# Patient Record
Sex: FEMALE | Race: WHITE | Employment: OTHER | URBAN - METROPOLITAN AREA
[De-identification: names, ages, dates, MRNs, and addresses within clinical notes are randomized per-mention and may not be internally consistent; named-entity substitution may affect disease eponyms.]

---

## 2017-12-13 ENCOUNTER — HOSPITAL ENCOUNTER (OUTPATIENT)
Dept: BONE DENSITY | Age: 77
Discharge: HOME | End: 2017-12-13
Attending: GENERAL ACUTE CARE HOSPITAL
Payer: MEDICARE

## 2017-12-13 ENCOUNTER — HOSPITAL ENCOUNTER (OUTPATIENT)
Dept: MAMMOGRAPHY | Age: 77
Discharge: HOME | End: 2017-12-13
Attending: GENERAL ACUTE CARE HOSPITAL
Payer: MEDICARE

## 2017-12-13 DIAGNOSIS — Z12.31 VISIT FOR SCREENING MAMMOGRAM: ICD-10-CM

## 2017-12-13 DIAGNOSIS — M81.0 OSTEOPOROSIS: ICD-10-CM

## 2017-12-13 PROCEDURE — 77067 SCR MAMMO BI INCL CAD: CPT

## 2017-12-13 PROCEDURE — 77080 DXA BONE DENSITY AXIAL: CPT

## 2018-05-14 ENCOUNTER — HOSPITAL ENCOUNTER (OUTPATIENT)
Dept: CT IMAGING | Age: 78
Discharge: HOME | End: 2018-05-14
Attending: GENERAL ACUTE CARE HOSPITAL
Payer: MEDICARE

## 2018-05-14 DIAGNOSIS — R63.4 ABNORMAL WEIGHT LOSS: ICD-10-CM

## 2018-05-14 PROCEDURE — 71250 CT THORAX DX C-: CPT

## 2018-06-04 ENCOUNTER — HOSPITAL ENCOUNTER (OUTPATIENT)
Dept: ULTRASOUND IMAGING | Age: 78
Discharge: HOME | End: 2018-06-04
Attending: GENERAL ACUTE CARE HOSPITAL
Payer: MEDICARE

## 2018-06-04 DIAGNOSIS — R82.79 POSITIVE URINE CULTURE: ICD-10-CM

## 2018-06-04 PROCEDURE — 76770 US EXAM ABDO BACK WALL COMP: CPT

## 2018-06-05 ENCOUNTER — HOSPITAL ENCOUNTER (EMERGENCY)
Age: 78
Discharge: HOME | End: 2018-06-05
Attending: EMERGENCY MEDICINE
Payer: MEDICARE

## 2018-06-05 ENCOUNTER — APPOINTMENT (OUTPATIENT)
Dept: GENERAL RADIOLOGY | Age: 78
End: 2018-06-05
Attending: PHYSICIAN ASSISTANT
Payer: MEDICARE

## 2018-06-05 VITALS
DIASTOLIC BLOOD PRESSURE: 97 MMHG | BODY MASS INDEX: 22.62 KG/M2 | HEIGHT: 60 IN | OXYGEN SATURATION: 99 % | HEART RATE: 73 BPM | WEIGHT: 115.2 LBS | SYSTOLIC BLOOD PRESSURE: 162 MMHG | RESPIRATION RATE: 16 BRPM | TEMPERATURE: 98 F

## 2018-06-05 DIAGNOSIS — E86.0 DEHYDRATION: ICD-10-CM

## 2018-06-05 DIAGNOSIS — K59.00 CONSTIPATION, UNSPECIFIED CONSTIPATION TYPE: Primary | ICD-10-CM

## 2018-06-05 LAB
ALBUMIN SERPL-MCNC: 4.3 G/DL (ref 3.5–5)
ALBUMIN/GLOB SERPL: 1.2 {RATIO} (ref 1.1–2.2)
ALP SERPL-CCNC: 73 U/L (ref 45–117)
ALT SERPL-CCNC: 104 U/L (ref 12–78)
ANION GAP SERPL CALC-SCNC: 7 MMOL/L (ref 5–15)
APPEARANCE UR: CLEAR
AST SERPL-CCNC: 87 U/L (ref 15–37)
BACTERIA URNS QL MICRO: NEGATIVE /HPF
BASOPHILS # BLD: 0 K/UL (ref 0–0.1)
BASOPHILS NFR BLD: 0 % (ref 0–1)
BILIRUB SERPL-MCNC: 0.9 MG/DL (ref 0.2–1)
BILIRUB UR QL: NEGATIVE
BUN SERPL-MCNC: 24 MG/DL (ref 6–20)
BUN/CREAT SERPL: 24 (ref 12–20)
CALCIUM SERPL-MCNC: 9.7 MG/DL (ref 8.5–10.1)
CHLORIDE SERPL-SCNC: 102 MMOL/L (ref 97–108)
CO2 SERPL-SCNC: 29 MMOL/L (ref 21–32)
COLOR UR: NORMAL
CREAT SERPL-MCNC: 1.02 MG/DL (ref 0.55–1.02)
DIFFERENTIAL METHOD BLD: ABNORMAL
EOSINOPHIL # BLD: 0.1 K/UL (ref 0–0.4)
EOSINOPHIL NFR BLD: 1 % (ref 0–7)
EPITH CASTS URNS QL MICRO: NORMAL /LPF
ERYTHROCYTE [DISTWIDTH] IN BLOOD BY AUTOMATED COUNT: 12.4 % (ref 11.5–14.5)
GLOBULIN SER CALC-MCNC: 3.6 G/DL (ref 2–4)
GLUCOSE SERPL-MCNC: 93 MG/DL (ref 65–100)
GLUCOSE UR STRIP.AUTO-MCNC: NEGATIVE MG/DL
HCT VFR BLD AUTO: 46.4 % (ref 35–47)
HGB BLD-MCNC: 15.3 G/DL (ref 11.5–16)
HGB UR QL STRIP: NEGATIVE
HYALINE CASTS URNS QL MICRO: NORMAL /LPF (ref 0–5)
IMM GRANULOCYTES # BLD: 0 K/UL (ref 0–0.04)
IMM GRANULOCYTES NFR BLD AUTO: 0 % (ref 0–0.5)
KETONES UR QL STRIP.AUTO: NEGATIVE MG/DL
LEUKOCYTE ESTERASE UR QL STRIP.AUTO: NEGATIVE
LIPASE SERPL-CCNC: 206 U/L (ref 73–393)
LYMPHOCYTES # BLD: 1.9 K/UL (ref 0.8–3.5)
LYMPHOCYTES NFR BLD: 16 % (ref 12–49)
MCH RBC QN AUTO: 30.1 PG (ref 26–34)
MCHC RBC AUTO-ENTMCNC: 33 G/DL (ref 30–36.5)
MCV RBC AUTO: 91.2 FL (ref 80–99)
MONOCYTES # BLD: 1 K/UL (ref 0–1)
MONOCYTES NFR BLD: 8 % (ref 5–13)
NEUTS SEG # BLD: 8.8 K/UL (ref 1.8–8)
NEUTS SEG NFR BLD: 74 % (ref 32–75)
NITRITE UR QL STRIP.AUTO: NEGATIVE
NRBC # BLD: 0 K/UL (ref 0–0.01)
NRBC BLD-RTO: 0 PER 100 WBC
PH UR STRIP: 5.5 [PH] (ref 5–8)
PLATELET # BLD AUTO: 231 K/UL (ref 150–400)
PMV BLD AUTO: 11.4 FL (ref 8.9–12.9)
POTASSIUM SERPL-SCNC: 4.3 MMOL/L (ref 3.5–5.1)
PROT SERPL-MCNC: 7.9 G/DL (ref 6.4–8.2)
PROT UR STRIP-MCNC: NEGATIVE MG/DL
RBC # BLD AUTO: 5.09 M/UL (ref 3.8–5.2)
RBC #/AREA URNS HPF: NORMAL /HPF (ref 0–5)
SODIUM SERPL-SCNC: 138 MMOL/L (ref 136–145)
SP GR UR REFRACTOMETRY: 1.01 (ref 1–1.03)
UR CULT HOLD, URHOLD: NORMAL
UROBILINOGEN UR QL STRIP.AUTO: 0.2 EU/DL (ref 0.2–1)
WBC # BLD AUTO: 11.9 K/UL (ref 3.6–11)
WBC URNS QL MICRO: NORMAL /HPF (ref 0–4)

## 2018-06-05 PROCEDURE — 81001 URINALYSIS AUTO W/SCOPE: CPT | Performed by: PHYSICIAN ASSISTANT

## 2018-06-05 PROCEDURE — 51798 US URINE CAPACITY MEASURE: CPT

## 2018-06-05 PROCEDURE — 85025 COMPLETE CBC W/AUTO DIFF WBC: CPT | Performed by: PHYSICIAN ASSISTANT

## 2018-06-05 PROCEDURE — 99284 EMERGENCY DEPT VISIT MOD MDM: CPT

## 2018-06-05 PROCEDURE — 77030011943

## 2018-06-05 PROCEDURE — 74019 RADEX ABDOMEN 2 VIEWS: CPT

## 2018-06-05 PROCEDURE — 96360 HYDRATION IV INFUSION INIT: CPT

## 2018-06-05 PROCEDURE — 74011250637 HC RX REV CODE- 250/637: Performed by: EMERGENCY MEDICINE

## 2018-06-05 PROCEDURE — 80053 COMPREHEN METABOLIC PANEL: CPT | Performed by: PHYSICIAN ASSISTANT

## 2018-06-05 PROCEDURE — 83690 ASSAY OF LIPASE: CPT | Performed by: PHYSICIAN ASSISTANT

## 2018-06-05 PROCEDURE — 51701 INSERT BLADDER CATHETER: CPT

## 2018-06-05 PROCEDURE — 74011250636 HC RX REV CODE- 250/636: Performed by: EMERGENCY MEDICINE

## 2018-06-05 PROCEDURE — 36415 COLL VENOUS BLD VENIPUNCTURE: CPT | Performed by: PHYSICIAN ASSISTANT

## 2018-06-05 RX ORDER — MAGNESIUM CITRATE
296 SOLUTION, ORAL ORAL
Status: COMPLETED | OUTPATIENT
Start: 2018-06-05 | End: 2018-06-05

## 2018-06-05 RX ORDER — POLYETHYLENE GLYCOL 3350 17 G/17G
17 POWDER, FOR SOLUTION ORAL DAILY
Qty: 140 G | Refills: 0 | Status: SHIPPED | OUTPATIENT
Start: 2018-06-05 | End: 2018-06-12

## 2018-06-05 RX ADMIN — MAGESIUM CITRATE 296 ML: 1.75 LIQUID ORAL at 15:09

## 2018-06-05 RX ADMIN — SODIUM CHLORIDE 500 ML: 900 INJECTION, SOLUTION INTRAVENOUS at 17:18

## 2018-06-05 NOTE — DISCHARGE INSTRUCTIONS
We hope that we have addressed all of your medical concerns. The examination and treatment you received in the Emergency Department were for an emergent problem and were not intended as complete care. It is important that you follow up with your healthcare provider(s) for ongoing care. If your symptoms worsen or do not improve as expected, and you are unable to reach your usual health care provider(s), you should return to the Emergency Department. Today's healthcare is undergoing tremendous change, and patient satisfaction surveys are one of the many tools to assess the quality of medical care. You may receive a survey from the [employer] regarding your experience in the Emergency Department. I hope that your experience has been completely positive, particularly the medical care that I provided. As such, please participate in the survey; anything less than excellent does not meet my expectations or intentions. [address] [place] [address] participate in nationally recognized quality of care measures. If your blood pressure is greater than 120/80, as reported below, we urge that you seek medical care to address the potential of high blood pressure, commonly known as hypertension. Hypertension can be hereditary or can be caused by certain medical conditions, pain, stress, or \"white coat syndrome. \"       Please make an appointment with your health care provider(s) for follow up of your Emergency Department visit. VITALS:   Patient Vitals for the past 8 hrs:   Temp Pulse Resp BP SpO2   06/05/18 1316 97.6 °F (36.4 °C) 80 18 131/72 95 %          Thank you for allowing us to provide you with medical care today. We realize that you have many choices for your emergency care needs. Please choose us in the future for any continued health care needs.       Karen Simon MD    [facility]   Office: [phone]            Recent Results (from the past 24 hour(s))   CBC WITH AUTOMATED DIFF    Collection Time: 06/05/18  1:33 PM   Result Value Ref Range    WBC 11.9 (H) 3.6 - 11.0 K/uL    RBC 5.09 3.80 - 5.20 M/uL    HGB 15.3 11.5 - 16.0 g/dL    HCT 46.4 35.0 - 47.0 %    MCV 91.2 80.0 - 99.0 FL    MCH 30.1 26.0 - 34.0 PG    MCHC 33.0 30.0 - 36.5 g/dL    RDW 12.4 11.5 - 14.5 %    PLATELET 123 345 - 451 K/uL    MPV 11.4 8.9 - 12.9 FL    NRBC 0.0 0  WBC    ABSOLUTE NRBC 0.00 0.00 - 0.01 K/uL    NEUTROPHILS 74 32 - 75 %    LYMPHOCYTES 16 12 - 49 %    MONOCYTES 8 5 - 13 %    EOSINOPHILS 1 0 - 7 %    BASOPHILS 0 0 - 1 %    IMMATURE GRANULOCYTES 0 0.0 - 0.5 %    ABS. NEUTROPHILS 8.8 (H) 1.8 - 8.0 K/UL    ABS. LYMPHOCYTES 1.9 0.8 - 3.5 K/UL    ABS. MONOCYTES 1.0 0.0 - 1.0 K/UL    ABS. EOSINOPHILS 0.1 0.0 - 0.4 K/UL    ABS. BASOPHILS 0.0 0.0 - 0.1 K/UL    ABS. IMM. GRANS. 0.0 0.00 - 0.04 K/UL    DF AUTOMATED     METABOLIC PANEL, COMPREHENSIVE    Collection Time: 06/05/18  1:33 PM   Result Value Ref Range    Sodium 138 136 - 145 mmol/L    Potassium 4.3 3.5 - 5.1 mmol/L    Chloride 102 97 - 108 mmol/L    CO2 29 21 - 32 mmol/L    Anion gap 7 5 - 15 mmol/L    Glucose 93 65 - 100 mg/dL    BUN 24 (H) 6 - 20 MG/DL    Creatinine 1.02 0.55 - 1.02 MG/DL    BUN/Creatinine ratio 24 (H) 12 - 20      GFR est AA >60 >60 ml/min/1.73m2    GFR est non-AA 53 (L) >60 ml/min/1.73m2    Calcium 9.7 8.5 - 10.1 MG/DL    Bilirubin, total 0.9 0.2 - 1.0 MG/DL    ALT (SGPT) 104 (H) 12 - 78 U/L    AST (SGOT) 87 (H) 15 - 37 U/L    Alk.  phosphatase 73 45 - 117 U/L    Protein, total 7.9 6.4 - 8.2 g/dL    Albumin 4.3 3.5 - 5.0 g/dL    Globulin 3.6 2.0 - 4.0 g/dL    A-G Ratio 1.2 1.1 - 2.2     LIPASE    Collection Time: 06/05/18  1:33 PM   Result Value Ref Range    Lipase 206 73 - 393 U/L   URINALYSIS W/MICROSCOPIC    Collection Time: 06/05/18  6:27 PM   Result Value Ref Range    Color YELLOW/STRAW      Appearance CLEAR CLEAR      Specific gravity 1.010 1.003 - 1.030      pH (UA) 5.5 5.0 - 8.0      Protein NEGATIVE  NEG mg/dL    Glucose NEGATIVE  NEG mg/dL    Ketone NEGATIVE  NEG mg/dL    Bilirubin NEGATIVE  NEG      Blood NEGATIVE  NEG      Urobilinogen 0.2 0.2 - 1.0 EU/dL    Nitrites NEGATIVE  NEG      Leukocyte Esterase NEGATIVE  NEG      WBC 0-4 0 - 4 /hpf    RBC 0-5 0 - 5 /hpf    Epithelial cells FEW FEW /lpf    Bacteria NEGATIVE  NEG /hpf    Hyaline cast 0-2 0 - 5 /lpf   URINE CULTURE HOLD SAMPLE    Collection Time: 06/05/18  6:27 PM   Result Value Ref Range    Urine culture hold        URINE ON HOLD IN MICROBIOLOGY DEPT FOR 3 DAYS. IF UNPRESERVED URINE IS SUBMITTED, IT CANNOT BE USED FOR ADDITIONAL TESTING AFTER 24 HRS, RECOLLECTION WILL BE REQUIRED. Xr Abd Flat/ Erect    Result Date: 6/5/2018  EXAM:  XR ABD FLAT/ ERECT INDICATION:   constipation; abdominal pain COMPARISON: None. FINDINGS: Supine and upright views of the abdomen demonstrate a normal gas pattern. There is no free intraperitoneal air. There is mild fecal stasis throughout the colon. . There are degenerative changes lower lumbar spine. .     IMPRESSION: No obstruction or free air. There is mild fecal stasis throughout the colon. Dehydration: Care Instructions  Your Care Instructions  Dehydration happens when your body loses too much fluid. This might happen when you do not drink enough water or you lose large amounts of fluids from your body because of diarrhea, vomiting, or sweating. Severe dehydration can be life-threatening. Water and minerals called electrolytes help put your body fluids back in balance. Learn the early signs of fluid loss, and drink more fluids to prevent dehydration. Follow-up care is a key part of your treatment and safety. Be sure to make and go to all appointments, and call your doctor if you are having problems. It's also a good idea to know your test results and keep a list of the medicines you take. How can you care for yourself at home?   · To prevent dehydration, drink plenty of fluids, enough so that your urine is light yellow or clear like water. Choose water and other caffeine-free clear liquids until you feel better. If you have kidney, heart, or liver disease and have to limit fluids, talk with your doctor before you increase the amount of fluids you drink. · If you do not feel like eating or drinking, try taking small sips of water, sports drinks, or other rehydration drinks. · Get plenty of rest.  To prevent dehydration  · Add more fluids to your diet and daily routine, unless your doctor has told you not to. · During hot weather, drink more fluids. Drink even more fluids if you exercise a lot. Stay away from drinks with alcohol or caffeine. · Watch for the symptoms of dehydration. These include:  ¨ A dry, sticky mouth. ¨ Dark yellow urine, and not much of it. ¨ Dry and sunken eyes. ¨ Feeling very tired. · Learn what problems can lead to dehydration. These include:  ¨ Diarrhea, fever, and vomiting. ¨ Any illness with a fever, such as pneumonia or the flu. ¨ Activities that cause heavy sweating, such as endurance races and heavy outdoor work in hot or humid weather. ¨ Alcohol or drug abuse or withdrawal.  ¨ Certain medicines, such as cold and allergy pills (antihistamines), diet pills (diuretics), and laxatives. ¨ Certain diseases, such as diabetes, cancer, and heart or kidney disease. When should you call for help? Call 911 anytime you think you may need emergency care. For example, call if:  ? · You passed out (lost consciousness). ?Call your doctor now or seek immediate medical care if:  ? · You are confused and cannot think clearly. ? · You are dizzy or lightheaded, or you feel like you may faint. ? · You have signs of needing more fluids. You have sunken eyes and a dry mouth, and you pass only a little dark urine. ? · You cannot keep fluids down. ? Watch closely for changes in your health, and be sure to contact your doctor if:  ? · You are not making tears. ? · Your skin is very dry and sags slowly back into place after you pinch it. ? · Your mouth and eyes are very dry. Where can you learn more? Go to http://raiza-radha.info/. Enter A919 in the search box to learn more about \"Dehydration: Care Instructions. \"  Current as of: March 20, 2017  Content Version: 11.4  © 4876-0463 [employer]. Care instructions adapted under license by [employer] (which disclaims liability or warranty for this information). If you have questions about a medical condition or this instruction, always ask your healthcare professional. [place] [address] any warranty or liability for your use of this information. Constipation: Care Instructions  Your Care Instructions    Constipation means that you have a hard time passing stools (bowel movements). People pass stools from 3 times a day to once every 3 days. What is normal for you may be different. Constipation may occur with pain in the rectum and cramping. The pain may get worse when you try to pass stools. Sometimes there are small amounts of bright red blood on toilet paper or the surface of stools. This is because of enlarged veins near the rectum (hemorrhoids). A few changes in your diet and lifestyle may help you avoid ongoing constipation. Your doctor may also prescribe medicine to help loosen your stool. Some medicines can cause constipation. These include pain medicines and antidepressants. Tell your doctor about all the medicines you take. Your doctor may want to make a medicine change to ease your symptoms. Follow-up care is a key part of your treatment and safety. Be sure to make and go to all appointments, and call your doctor if you are having problems. It's also a good idea to know your test results and keep a list of the medicines you take. How can you care for yourself at home?   · Drink plenty of fluids, enough so that your urine is light yellow or clear like water. If you have kidney, heart, or liver disease and have to limit fluids, talk with your doctor before you increase the amount of fluids you drink. · Include high-fiber foods in your diet each day. These include fruits, vegetables, beans, and whole grains. · Get at least 30 minutes of exercise on most days of the week. Walking is a good choice. You also may want to do other activities, such as running, swimming, cycling, or playing tennis or team sports. · Take a fiber supplement, such as Citrucel or Metamucil, every day. Read and follow all instructions on the label. · Schedule time each day for a bowel movement. A daily routine may help. Take your time having your bowel movement. · Support your feet with a small step stool when you sit on the toilet. This helps flex your hips and places your pelvis in a squatting position. · Your doctor may recommend an over-the-counter laxative to relieve your constipation. Examples are Milk of Magnesia and MiraLax. Read and follow all instructions on the label. Do not use laxatives on a long-term basis. When should you call for help? Call your doctor now or seek immediate medical care if:  ? · You have new or worse belly pain. ? · You have new or worse nausea or vomiting. ? · You have blood in your stools. ? Watch closely for changes in your health, and be sure to contact your doctor if:  ? · Your constipation is getting worse. ? · You do not get better as expected. Where can you learn more? Go to http://raiaz-radha.info/. Enter 21 [phone] in the search box to learn more about \"Constipation: Care Instructions. \"  Current as of: March 20, 2017  Content Version: 11.4  © 0576-8360 [employer]. Care instructions adapted under license by [employer] (which disclaims liability or warranty for this information).  If you have questions about a medical condition or this instruction, always ask your healthcare professional. [address] any warranty or liability for your use of this information. Urine Culture: About This Test  What is it? A urine culture is a test to find germs (such as bacteria) that can cause an infection. A sample of urine is added to a substance that promotes the growth of germs. If no germs grow, the culture is negative. If germs that can cause infection grow, the culture is positive. The type of germ may be identified using a microscope or chemical tests. Why is this test done? A urine culture may be done to:  · Find the cause of a urinary tract infection (UTI). · Make decisions about the best treatment for a UTI. · Find out whether treatment for a UTI worked. How can you prepare for the test?  You don't need to do anything before you have the test. If you are taking or have recently taken antibiotics, tell your doctor. What happens before the test?  You will need to drink enough fluids and avoid urinating so that you will be able to collect a urine sample. What happens during the test?  You will be asked to collect a clean-catch midstream urine sample for testing. The first urine of the day is best because bacteria levels will be higher. · Wash your hands before collecting the urine. · If the container has a lid, remove the lid of the container and set it down with the inner surface up. · Clean the area around your penis or vagina. · Begin urinating into the toilet or urinal.  · After the urine has flowed for several seconds, place the collection container in the stream and collect about 2 ounces (a quarter cup) of this \"midstream\" urine without stopping the flow. · Don't touch the rim of the container to your genital area. · Finish urinating into the toilet or urinal.  · Carefully replace the lid on the container. · Wash your hands. How long does the test take? · The test will take a few minutes.   What happens after the test?  · You will probably be able to go home right away. The results of a urine culture are usually available in 1 to 3 days. · You can go back to your usual activities right away. Follow-up care is a key part of your treatment and safety. Be sure to make and go to all appointments, and call your doctor if you are having problems. It's also a good idea to keep a list of the medicines you take. Ask your doctor when you can expect to have your test results. Where can you learn more? Go to http://raiza-radha.info/. Enter L533 in the search box to learn more about \"Urine Culture: About This Test.\"  Current as of: October 14, 2016  Content Version: 11.4  © 8884-2302 [employer]. Care instructions adapted under license by [employer] (which disclaims liability or warranty for this information). If you have questions about a medical condition or this instruction, always ask your healthcare professional. [place] [address] any warranty or liability for your use of this information.

## 2018-06-05 NOTE — ED NOTES
Pt attempted urine sample without success. Will retry. Pt drinking mag citrate.     4:02 PM  Unable to urinate or have a BM after drinking mag citrate. MD made aware. 4:27 PM  Pt given fleets enema, unable to hold solution in rectum for success. Soap suds enema performed with success. Positive BM.

## 2018-06-05 NOTE — ED NOTES

## 2018-06-05 NOTE — ED TRIAGE NOTES
Pt complains of constipation and abd pain, last BM unknown. States being seen at [facility] and has had medications for this and \"nothing has helped\". States her last \"real\" BM was 6 months ago. Also complains of UTI, states that she has been treated for this as well and \"nothing seems to work\".

## 2018-06-05 NOTE — ED PROVIDER NOTES
HPI Comments: 69 y.o. female with past medical history significant for constipation and acid reflux who presents from home via private vehicle with chief complaint of constipation. Pt states she is here for constipation and a UTI. Pt reports 5 months of constipation, reports one BM per week in this time. Pt reports she has been seen by her PCP at [facility] and has been seen by GI and placed on lactulose without relief. Pt reports using enemas without relief at home. Pt states she was scheduled for an US of her kidneys, which she has done yesterday. When asked why she thinks she has a UTI, pt reports she has had dysuria for 2-3 months and has been treated multiple times, finished her last course of antibiotics yesterday. Pt denies any vomiting, fever, or hematuria. There are no other acute medical concerns at this time. Social hx: Nonsmoker; No EtOH use  PCP: Elvie Gates MD  GI: Bubba Gleason MD    Note written by Kallie Lugo, as dictated by Néstor Cardenas MD 2:30 PM    The history is provided by the patient. No  was used. Past Medical History:   Diagnosis Date    Acid reflux     Arthritis     Constipation     Diarrhea     Elevated cholesterol        Past Surgical History:   Procedure Laterality Date    HX HYSTERECTOMY           History reviewed. No pertinent family history. Social History     Social History    Marital status:      Spouse name: N/A    Number of children: N/A    Years of education: N/A     Occupational History    Not on file. Social History Main Topics    Smoking status: Never Smoker    Smokeless tobacco: Never Used    Alcohol use No    Drug use: Not on file    Sexual activity: Not on file     Other Topics Concern    Not on file     Social History Narrative         ALLERGIES: Review of patient's allergies indicates not on file. Review of Systems   Constitutional: Negative for activity change, chills and fever.    HENT: Negative for nosebleeds, sore throat, trouble swallowing and voice change. Eyes: Negative for visual disturbance. Respiratory: Negative for shortness of breath. Cardiovascular: Negative for chest pain and palpitations. Gastrointestinal: Positive for abdominal pain and constipation. Negative for diarrhea, nausea and vomiting. Genitourinary: Positive for dysuria. Negative for difficulty urinating, hematuria and urgency. Musculoskeletal: Negative for back pain, neck pain and neck stiffness. Skin: Negative for color change. Allergic/Immunologic: Negative for immunocompromised state. Neurological: Negative for dizziness, seizures, syncope, weakness, light-headedness, numbness and headaches. Psychiatric/Behavioral: Negative for behavioral problems, confusion, hallucinations, self-injury and suicidal ideas. All other systems reviewed and are negative. Vitals:    06/05/18 1316   BP: 131/72   Pulse: 80   Resp: 18   Temp: 97.6 °F (36.4 °C)   SpO2: 95%   Weight: 52.3 kg (115 lb 3.2 oz)   Height: 5' (1.524 m)            Physical Exam   Constitutional: She is oriented to person, place, and time. She appears well-developed and well-nourished. No distress. HENT:   Head: Normocephalic and atraumatic. Eyes: Pupils are equal, round, and reactive to light. Neck: Normal range of motion. Neck supple. Cardiovascular: Normal rate, regular rhythm and normal heart sounds. Exam reveals no gallop and no friction rub. No murmur heard. Pulmonary/Chest: Effort normal and breath sounds normal. No respiratory distress. She has no wheezes. Abdominal: Soft. Bowel sounds are normal. She exhibits no distension. There is generalized tenderness. There is no rebound and no guarding. Mild diffuse abdominal tenderness, no rebound or guarding. Musculoskeletal: Normal range of motion. Neurological: She is alert and oriented to person, place, and time. Skin: Skin is warm. No rash noted.  She is not diaphoretic. Psychiatric: She has a normal mood and affect. Her behavior is normal. Judgment and thought content normal.   Nursing note and vitals reviewed. Note written by Kallie Palmer, as dictated by Yuni Hi MD 2:30 PM    [facility]      ED Course     This is a 80-year-old female with past medical history, review of systems, physical exam as above, presenting with complaints of chronic constipation, and teary it. Patient states approximately 5 months constipation, has been evaluated by her primary care service with plain films, CT scans, seen by GI last week, and prescribed lactulose. She endorses mild abdominal pain, and consistent bowel movements, denies nausea or vomiting. She also endorses chronic dysuria, for which she has been treated with antibiotics. Physical exam remarkable for well-appearing elderly female with mild abdominal tenderness to palpation, without rebound or guarding. Plan to obtain KUB, CMP, CBC, UA, administer magnesium citrate, and enema, and make a disposition based on the patient's diagnostics and response to therapy    Procedures    4:09 PM  Patient w/o BM s/p Mag Citrate, will perform enema, waiting urine. 4:45 PM  Patient with successful BM via enema, waiting urine, will bladder scan and cath. Patient signed out to Dr. Enrique Martinez at bedside.

## 2018-06-05 NOTE — ED NOTES
PROGRESS NOTE:  4:51 PM  Pt care transferred to Dr. Tanisha Stock. Waiting on urine and enema. PROGRESS NOTE:  6:24 PM  Pt received 500 fluids. She only has 81 mL of fluid in her bladder. Will cath to send it off. Will give the pt water to drink. Discussed with the pt and her  how she needs to drink fluids which will help with her constipation.  She sees Ashish Bundy had ct scan in may which was normal per pt

## 2018-06-05 NOTE — ED NOTES
Pt bladder scanned x3, 0mL each time    1820: Pt bladder scanned after 500 bolus, 81mL shown on scan. Will straight cath per MD order    1920: MD reviewed discharge instructions and options with patient and patient verbalized understanding. RN reviewed discharge instructions using teachback method. Pt ambulated to exit without difficulty and in no signs of acute distress escorted by  who will drive home. No complaints or needs expressed at this time. Patient was counseled on medications prescribed at discharge. VSS at time of discharge. Pt to call PCP in the morning for follow up.

## 2018-06-07 NOTE — CALL BACK NOTE
[facility] Emergency Department Follow Up Call Record    Discharged to : Home/Family Home/Home Health/Skilled Facility/Rehab/Assisted Living/Other__home_____  1) Did you receive your discharge instructions? Yes This writer spoke with [employer] ,  verified by patient. 2) Do you understand them? Yes         3) Are you able to follow them? Yes          If NO, what can I clarify for you? 4) Do you understand your diagnosis? Yes         5) Do you know which symptoms should prompt you to call the doctor? Yes  Discussed any further concerns she had concerning problems with constipation to call GI or PCP for follow up care. 6) Were you able to fill and  any medications that were prescribed? Yes     7) You were prescribed _miralax_________for ___constipation_________________. Common side effects of this medication are_loose stools___________________. This is not a complete list so please review the forms given from the pharmacy for a complete list.      8) Are there any questions about your medications? Yes. Mrs. Kristal Rooney stated she had several bowel movements yesterday, but none today  after taking the Miralax as ordered. This writer encouraged her to continue taking this medication to give it chance to work and also increase her fluid intake like drinking more water. Have you scheduled any recommended doctors appointments (specialty, PCP) NO. If NO, what barriers are you encountering (transportation/lost contact info/cost/  didnt think necessary/no PCP  9) If discharged with Home Health, has the agency contacted you to schedule visit? Not applicable. 10) Is there anyone available to help you at home (meals, errands, transportation    monitoring) (adult children, neighbors, private duty companions) Yes     6) Are you on a special diet? No         If YES, do you understand the requirements for this diet? Education provided?   12) If presented with cough, bronchitis, COPD, asthma, is it ok to ask that the   respiratory disease management educator call you? Not applicable      13)  A) If presented with fall, were you issued an assistive device in the ED    Are you using? Not applicable  B) If given RX for device, have you obtained? Not applicable       If NO, barriers? C) Therapist recommended: NO   Are you able to implement the suggestions? Not applicable        If NO, barriers to implementation? D) Are you having any difficulties with mobility inside your home?     (steps, bed, tub)No   If YES, ask if the SSED PT can contact patient and good time and number?  14)  At the end of your discharge instructions, there is information about accessing [facility], have you had a chance to review those? Yes         Do you have any questions about signing up for this service? NO    We encourage our patients to be active participants in their healthcare and this site is one of the ways to do that. It will allow you to access parts of your medical record, email your doctors office, schedule appointments, and request medications refills . 15) Are there any other questions that I can answer for you regarding    your Emergency department visit?  NO             Estimated Call Time:___3:32 PM  ________________ Date/Time:_______________

## 2018-06-18 ENCOUNTER — HOSPITAL ENCOUNTER (OUTPATIENT)
Dept: ULTRASOUND IMAGING | Age: 78
Discharge: HOME | End: 2018-06-18
Attending: GENERAL ACUTE CARE HOSPITAL
Payer: MEDICARE

## 2018-06-18 DIAGNOSIS — R33.8 ACUTE URINARY RETENTION: ICD-10-CM

## 2018-06-18 PROCEDURE — 76770 US EXAM ABDO BACK WALL COMP: CPT

## 2018-07-27 ENCOUNTER — APPOINTMENT (OUTPATIENT)
Dept: CT IMAGING | Age: 78
End: 2018-07-27
Attending: EMERGENCY MEDICINE
Payer: MEDICARE

## 2018-07-27 ENCOUNTER — APPOINTMENT (OUTPATIENT)
Dept: GENERAL RADIOLOGY | Age: 78
End: 2018-07-27
Attending: EMERGENCY MEDICINE
Payer: MEDICARE

## 2018-07-27 ENCOUNTER — HOSPITAL ENCOUNTER (EMERGENCY)
Age: 78
Discharge: HOME | End: 2018-07-27
Attending: EMERGENCY MEDICINE
Payer: MEDICARE

## 2018-07-27 VITALS
SYSTOLIC BLOOD PRESSURE: 136 MMHG | TEMPERATURE: 98 F | OXYGEN SATURATION: 98 % | DIASTOLIC BLOOD PRESSURE: 72 MMHG | WEIGHT: 100.6 LBS | HEIGHT: 60 IN | HEART RATE: 90 BPM | RESPIRATION RATE: 16 BRPM | BODY MASS INDEX: 19.75 KG/M2

## 2018-07-27 DIAGNOSIS — R62.7 FTT (FAILURE TO THRIVE) IN ADULT: Primary | ICD-10-CM

## 2018-07-27 LAB
ALBUMIN SERPL-MCNC: 3.8 G/DL (ref 3.5–5)
ALBUMIN/GLOB SERPL: 1.1 {RATIO} (ref 1.1–2.2)
ALP SERPL-CCNC: 75 U/L (ref 45–117)
ALT SERPL-CCNC: 21 U/L (ref 12–78)
ANION GAP SERPL CALC-SCNC: 8 MMOL/L (ref 5–15)
APPEARANCE UR: CLEAR
AST SERPL-CCNC: 45 U/L (ref 15–37)
BACTERIA URNS QL MICRO: NEGATIVE /HPF
BASOPHILS # BLD: 0 K/UL (ref 0–0.1)
BASOPHILS NFR BLD: 0 % (ref 0–1)
BILIRUB SERPL-MCNC: 0.8 MG/DL (ref 0.2–1)
BILIRUB UR QL: NEGATIVE
BNP SERPL-MCNC: 247 PG/ML (ref 0–450)
BUN SERPL-MCNC: 25 MG/DL (ref 6–20)
BUN/CREAT SERPL: 27 (ref 12–20)
CALCIUM SERPL-MCNC: 9.5 MG/DL (ref 8.5–10.1)
CHLORIDE SERPL-SCNC: 104 MMOL/L (ref 97–108)
CO2 SERPL-SCNC: 27 MMOL/L (ref 21–32)
COLOR UR: ABNORMAL
CREAT SERPL-MCNC: 0.92 MG/DL (ref 0.55–1.02)
DIFFERENTIAL METHOD BLD: ABNORMAL
EOSINOPHIL # BLD: 0 K/UL (ref 0–0.4)
EOSINOPHIL NFR BLD: 0 % (ref 0–7)
EPITH CASTS URNS QL MICRO: ABNORMAL /LPF
ERYTHROCYTE [DISTWIDTH] IN BLOOD BY AUTOMATED COUNT: 12.4 % (ref 11.5–14.5)
GLOBULIN SER CALC-MCNC: 3.4 G/DL (ref 2–4)
GLUCOSE SERPL-MCNC: 139 MG/DL (ref 65–100)
GLUCOSE UR STRIP.AUTO-MCNC: NEGATIVE MG/DL
HCT VFR BLD AUTO: 46.3 % (ref 35–47)
HGB BLD-MCNC: 15.2 G/DL (ref 11.5–16)
HGB UR QL STRIP: NEGATIVE
HYALINE CASTS URNS QL MICRO: ABNORMAL /LPF (ref 0–5)
IMM GRANULOCYTES # BLD: 0 K/UL (ref 0–0.04)
IMM GRANULOCYTES NFR BLD AUTO: 0 % (ref 0–0.5)
KETONES UR QL STRIP.AUTO: ABNORMAL MG/DL
LEUKOCYTE ESTERASE UR QL STRIP.AUTO: NEGATIVE
LYMPHOCYTES # BLD: 1.1 K/UL (ref 0.8–3.5)
LYMPHOCYTES NFR BLD: 14 % (ref 12–49)
MCH RBC QN AUTO: 30 PG (ref 26–34)
MCHC RBC AUTO-ENTMCNC: 32.8 G/DL (ref 30–36.5)
MCV RBC AUTO: 91.3 FL (ref 80–99)
MONOCYTES # BLD: 0.5 K/UL (ref 0–1)
MONOCYTES NFR BLD: 7 % (ref 5–13)
NEUTS SEG # BLD: 6.5 K/UL (ref 1.8–8)
NEUTS SEG NFR BLD: 79 % (ref 32–75)
NITRITE UR QL STRIP.AUTO: NEGATIVE
NRBC # BLD: 0 K/UL (ref 0–0.01)
NRBC BLD-RTO: 0 PER 100 WBC
PH UR STRIP: 6 [PH] (ref 5–8)
PLATELET # BLD AUTO: 196 K/UL (ref 150–400)
PMV BLD AUTO: 11.4 FL (ref 8.9–12.9)
POTASSIUM SERPL-SCNC: 4 MMOL/L (ref 3.5–5.1)
PROT SERPL-MCNC: 7.2 G/DL (ref 6.4–8.2)
PROT UR STRIP-MCNC: NEGATIVE MG/DL
RBC # BLD AUTO: 5.07 M/UL (ref 3.8–5.2)
RBC #/AREA URNS HPF: ABNORMAL /HPF (ref 0–5)
SODIUM SERPL-SCNC: 139 MMOL/L (ref 136–145)
SP GR UR REFRACTOMETRY: 1.02 (ref 1–1.03)
TROPONIN I SERPL-MCNC: <0.05 NG/ML
UR CULT HOLD, URHOLD: NORMAL
UROBILINOGEN UR QL STRIP.AUTO: 0.2 EU/DL (ref 0.2–1)
WBC # BLD AUTO: 8.3 K/UL (ref 3.6–11)
WBC URNS QL MICRO: ABNORMAL /HPF (ref 0–4)

## 2018-07-27 PROCEDURE — 71045 X-RAY EXAM CHEST 1 VIEW: CPT

## 2018-07-27 PROCEDURE — 80053 COMPREHEN METABOLIC PANEL: CPT | Performed by: EMERGENCY MEDICINE

## 2018-07-27 PROCEDURE — 93005 ELECTROCARDIOGRAM TRACING: CPT

## 2018-07-27 PROCEDURE — 74011636320 HC RX REV CODE- 636/320: Performed by: EMERGENCY MEDICINE

## 2018-07-27 PROCEDURE — 72125 CT NECK SPINE W/O DYE: CPT

## 2018-07-27 PROCEDURE — 74177 CT ABD & PELVIS W/CONTRAST: CPT

## 2018-07-27 PROCEDURE — 83880 ASSAY OF NATRIURETIC PEPTIDE: CPT | Performed by: EMERGENCY MEDICINE

## 2018-07-27 PROCEDURE — 97161 PT EVAL LOW COMPLEX 20 MIN: CPT

## 2018-07-27 PROCEDURE — 97530 THERAPEUTIC ACTIVITIES: CPT

## 2018-07-27 PROCEDURE — 97116 GAIT TRAINING THERAPY: CPT

## 2018-07-27 PROCEDURE — 51701 INSERT BLADDER CATHETER: CPT

## 2018-07-27 PROCEDURE — 36415 COLL VENOUS BLD VENIPUNCTURE: CPT | Performed by: EMERGENCY MEDICINE

## 2018-07-27 PROCEDURE — 77030011943

## 2018-07-27 PROCEDURE — 70450 CT HEAD/BRAIN W/O DYE: CPT

## 2018-07-27 PROCEDURE — 74011000258 HC RX REV CODE- 258: Performed by: EMERGENCY MEDICINE

## 2018-07-27 PROCEDURE — 81001 URINALYSIS AUTO W/SCOPE: CPT | Performed by: EMERGENCY MEDICINE

## 2018-07-27 PROCEDURE — 74011250636 HC RX REV CODE- 250/636: Performed by: EMERGENCY MEDICINE

## 2018-07-27 PROCEDURE — 99285 EMERGENCY DEPT VISIT HI MDM: CPT

## 2018-07-27 PROCEDURE — 84484 ASSAY OF TROPONIN QUANT: CPT | Performed by: EMERGENCY MEDICINE

## 2018-07-27 PROCEDURE — 85025 COMPLETE CBC W/AUTO DIFF WBC: CPT | Performed by: EMERGENCY MEDICINE

## 2018-07-27 RX ORDER — FOLIC ACID/MULTIVIT,IRON,MINER 0.4MG-18MG
400 TABLET ORAL 2 TIMES DAILY
COMMUNITY
End: 2018-07-27

## 2018-07-27 RX ORDER — LACTULOSE 10 G/15ML
10 SOLUTION ORAL; RECTAL
COMMUNITY
End: 2019-01-04 | Stop reason: ALTCHOICE

## 2018-07-27 RX ORDER — ACETAMINOPHEN 500 MG
1000 TABLET ORAL ONCE
Status: DISCONTINUED | OUTPATIENT
Start: 2018-07-27 | End: 2018-07-27 | Stop reason: HOSPADM

## 2018-07-27 RX ORDER — SODIUM CHLORIDE 0.9 % (FLUSH) 0.9 %
10 SYRINGE (ML) INJECTION
Status: COMPLETED | OUTPATIENT
Start: 2018-07-27 | End: 2018-07-27

## 2018-07-27 RX ORDER — FERROUS SULFATE, DRIED 160(50) MG
1 TABLET, EXTENDED RELEASE ORAL 2 TIMES DAILY WITH MEALS
COMMUNITY

## 2018-07-27 RX ORDER — PANTOPRAZOLE SODIUM 40 MG/1
40 TABLET, DELAYED RELEASE ORAL 2 TIMES DAILY
COMMUNITY
End: 2019-01-17 | Stop reason: SDUPTHER

## 2018-07-27 RX ADMIN — SODIUM CHLORIDE 100 ML: 900 INJECTION, SOLUTION INTRAVENOUS at 10:01

## 2018-07-27 RX ADMIN — Medication 10 ML: at 10:01

## 2018-07-27 RX ADMIN — IOPAMIDOL 100 ML: 755 INJECTION, SOLUTION INTRAVENOUS at 10:01

## 2018-07-27 RX ADMIN — SODIUM CHLORIDE 1000 ML: 900 INJECTION, SOLUTION INTRAVENOUS at 08:47

## 2018-07-27 NOTE — PROGRESS NOTES
Date of previous inpatient admission/ ED visit? 6/5/18 What brought the patient back to ED? Patient presents the ED with chief complaint of dizziness and s/p fall Did patient decline recommended services during last admission/ ED visit (if yes, what)? No 
 
Has patient seen a provider since their last inpatient admission/ED visit (if yes, when)? YesNolen Natalie is [facility] and PCP is Yahaira Dupree (seen 6/27/18) CM Interventions: 
From previous inpatient admission/ED visit: No previous assessment noted From current inpatient admission/ED visit: Assessment SSED/CM consult received and appreciated. EMR reviewed. History significant for constipation and reflux. Patient presents to the ED w/ dizziness and s/p fall. Met w/patient and spouse - introduced to role of CM.  states in the ED \" because I blacked out. \" Noted during assessment patient and spouse would talk over each other and CM and not answer questions asked. This writer continued to redirect both during time. Patient verbalizes weight loss 8-9 months and 1-2 weeks unable to eat and pass bowels.  showed his wounds from tumor/kidney surgery on 7/10 and states d/cd 7/11 from [address] [place] [address] concerned about patient not being able to cook meals and walk to the . Patient and spouse both have 3 children before marriage. Lorrie Garcia states he once provided care for a parent in the home -  \"Children today only want to send you to a Nursing Home. They dont want to help at home. \"  
 
Last appointment  At [facility] [place] 6/27/18 per patient and next scheduled is 8/1/18. Patient is followed by psychiatrist (spouse questions if medications have altered intake). Call placed to [facility] spoke w/ GLENDY,RN provided updates by Beatriz La and CM. Tarah Cool has history of coming into office 1-2 times per/week including specialist visits.  In May was evaluated by  (GI) then f/u appointment w/ PCP alternative appointment for 6/29/18 patient was a no show. MSW ordered 4/53/87 for MOW application for meals. Nurse RN acknowledged patient w/ history of anxiety and as a result of having bell expressed fears of eating. Obtained contact for GONZALEZ Foley.  
 
Nancy Alvarado expressing inability to get into house and walk to bathroom. CM placed call back to [facility]diaziz w/ Casey Rasmussen. Email communication sent to [facility] regarding Home Safety evaluation and transportation home. Nany Stern obtained CM name/number and to call back regarding transportation. Updates received from SSED/PT. Patient able to ambulate no skilled services recommended. Patient able to return back home via family vehicle. [employer] is insurance provider [facility] and [facility] are pharmacies utilized. Care Management Interventions PCP Verified by CM: Yes Last Visit to PCP: 06/27/18 Palliative Care Criteria Met (RRAT>21 & CHF Dx)?: No 
Transition of Care Consult (CM Consult): Discharge Planning MyChart Signup: No 
Discharge Durable Medical Equipment: No 
Health Maintenance Reviewed: Yes Physical Therapy Consult: Yes Occupational Therapy Consult: No 
Speech Therapy Consult: No 
Current Support Network: Lives with Spouse, Own Home Confirm Follow Up Transport:  (TBD) Plan discussed with Pt/Family/Caregiver: Yes [employer] Information Provided?: No 
Discharge Location Discharge Placement:  (TBD)

## 2018-07-27 NOTE — ED NOTES
Patient given discharge instructions. No questions or concerns at this time. Patient's VSS and in no acute distress. Patient wheeled out of unit at discharge.

## 2018-07-27 NOTE — SENIOR SERVICES NOTE
SSED consult received and appreciated. Met with patient and . Pt states that she does not know how she will walk into house or get to bathroom if she goes home although she ambulated with PT in ER. Will request that [facility] set up home health. Pt and  also report that patient has not been able to bathe or shower herself.  recently had surgery had states that he is unable to assist the patient because he has weight limitations. Spoke with Franny Cason (nurse) at [facility] regarding patient and weight loss. 15 pound weight loss reported since 6/5/18. Pt has seen PCP, behavioral health, GI (missed 6/26/18 appointment), SW at [facility]. Has appointments with [facility] PCP and psychologist on 8/1 and psychiatrist on 8/9. Will give patient some Ensure and enterade samples. And discuss transportation options with CM Dr. Samuel Geronimo aware and in agreement with plan Pt ambulated from ER room 15 to back steps and up and down 3 steps and back to room 15 without difficulty but continues to say she cannot go to the bathroom. Pt had an excuse for everything solution that I would offer. IV removed and discharge instructions reviewed with patient and . Anxiety seems to be the central problem. Explained that we have spoken to [facility] about getting home health assistance.

## 2018-07-27 NOTE — PROGRESS NOTES
SSED Pharmacy Consult: 
 
Recommendations: 
Updated PTA medication list. Added medications listed below. Also manually listed medications recorded by [facility] below (patient reported medical center). There are no concerns with medications on PTA list, however Rx Query shows the patient recently filled clonazepam 0.5 mg BID prn (7/3/18 for #30 tablets) and mirtazepine 15 mg every day (6/13/18 for # 90 tablets). The patient reports she stopped both of these medications as they made her drowsy/dizzy. Also, Rx Query shows a recent fill for oxybutynin 5 mg daily (6/12/18 for # 90 tablets). The patient reports she does not believe she is still taking this medication. Clonazepam presents concerns of cognitive impairment, delirium, unsteady gait, syncope, falls, accidents, and fractures. If alternatives are desired for clonazepam, agents such as SSRIs, SNRIs, and buspirone can be considered for anxiety per Beers criteria. Oxybutynin presents concerns of agitation, confusion, and urinary retention. Behavioral therapy is a suggested alternative for urge incontinence with oxybutynin per START STOPP criteria. Medication list obtained from: Patient and patient's visitor Significant PMH/Disease States:  
Past Medical History:  
Diagnosis Date  Acid reflux  Arthritis  Constipation  Diarrhea  Elevated cholesterol Chief Complaint for this Admission: Chief Complaint Patient presents with  Dizziness Allergies:  Tylenol [acetaminophen] Prior to Admission Medications:  
Prior to Admission Medications Prescriptions Last Dose Informant Patient Reported? Taking?  
calcium-vitamin D (OYSTER SHELL) 500 mg(1,250mg) -200 unit per tablet 7/26/2018 at PM  Yes Yes Sig: Take 1 Tab by mouth two (2) times daily (with meals). cyanocobalamin, vitamin B-12, (VITAMIN B12 PO) 7/20/2018 at Unknown time  Yes Yes Sig: Take 1 Tab by mouth daily.  Strength unknown  
lactulose (CHRONULAC) 10 gram/15 mL solution  at unknown  Yes Yes Sig: Take 10 g by mouth daily (after breakfast). pantoprazole (PROTONIX) 40 mg tablet  at unknown  Yes Yes Sig: Take 40 mg by mouth two (2) times a day. Facility-Administered Medications: None The patient reports she only takes the medications listed above and she gets her medications from [facility]. Nickola Aase to compare PTA list to medication list on file. Of note, the following medications were also listed by [facility]: 
 
-acetaminophen 1000 mg TID prn pain 
-alendronate 70 mg once weekly 
-aspirin 81 mg daily -atorvastatin 80 mg daily 
-colace 100 mg daily as needed 
-fluticasone nasal spray 2 sprays in each nostril once daily 
-loratadine 10 mg daily prn 
-magnesium citrate 100 mg daily prn 
-naproxen 500 mg BID prn 
-ranitidine 150 mg BID  
-Restasis 0.05% 1 drop in both eyes BID 
-senna 8.6 mg BID prn 
-Voltaren 1 % gel apply to AA once daily Fadumo Ortega

## 2018-07-27 NOTE — ED PROVIDER NOTES
HPI Comments: 69 y.o. female with past medical history significant for constipation and acid reflux who presents from home via EMS s/p fall with chief complaint of dizziness. Pt states she started to feel dizzy this morning around 0700, her  told her to go lie down, but the pt states she did not make it to the bed as she fell between her bed and a bedside commode. Pt reports landing on her back, hitting her head on her dresser, denies any injury or LOC, but note some neck pain. Pt states she has not ate anything in \"2-3 weeks\", reports decreased urination, and constipation. Pt reports having a small BM yesterday, but \"couldn't get it all out\". Pt reports generalized abdominal pain and weight loss as she \"can't eat\". Pt reports living with her , denies any home help, reports walking without assistance at home. Pt reports taking ASA daily. Pt reports a history of a hiatal hernia. Pt denies any current stool softener use. Pt denies any vomiting, fever, SOB, or chest pain. There are no other acute medical concerns at this time. Old Chart Review: Pt was seen here 6/5 for constipation, going on for 5 months. Pt had mallorie citrate without relief, followed by an enema with a BM. Pt was educated on the important of drinking fluids and discharged on polyethylene glycol. Social hx: Nonsmoker; No EtOH use PCP: Kevin Fox MD 
 
Note written by Kallie Villalobos, as dictated by Maverick Reynoso MD 8:40 AM 
 
The history is provided by the patient, the EMS personnel and medical records. No  was used. Past Medical History:  
Diagnosis Date  Acid reflux  Arthritis  Constipation  Diarrhea  Elevated cholesterol Past Surgical History:  
Procedure Laterality Date  HX HYSTERECTOMY History reviewed. No pertinent family history. Social History Social History  Marital status:    Spouse name: N/A  
 Number of children: N/A  Years of education: N/A Occupational History  Not on file. Social History Main Topics  Smoking status: Never Smoker  Smokeless tobacco: Never Used  Alcohol use No  
 Drug use: Not on file  Sexual activity: Not on file Other Topics Concern  Not on file Social History Narrative ALLERGIES: Tylenol [acetaminophen] Review of Systems Constitutional: Negative for activity change, chills and fever. HENT: Negative for nosebleeds, sore throat, trouble swallowing and voice change. Eyes: Negative for visual disturbance. Respiratory: Negative for shortness of breath. Cardiovascular: Negative for chest pain and palpitations. Gastrointestinal: Positive for abdominal pain and constipation. Negative for diarrhea and nausea. Genitourinary: Positive for decreased urine volume. Negative for difficulty urinating, dysuria, hematuria and urgency. Musculoskeletal: Positive for neck pain. Negative for back pain and neck stiffness. Skin: Negative for color change. Allergic/Immunologic: Negative for immunocompromised state. Neurological: Positive for dizziness. Negative for seizures, syncope, weakness, light-headedness, numbness and headaches. Psychiatric/Behavioral: Negative for behavioral problems, confusion, hallucinations, self-injury and suicidal ideas. All other systems reviewed and are negative. Vitals:  
 07/27/18 3254 Height: 5' (1.524 m) Physical Exam  
Constitutional: She is oriented to person, place, and time. She appears well-developed and well-nourished. No distress. HENT:  
Head: Normocephalic. No obvious head trauma or tenderness to palpation. Eyes: Pupils are equal, round, and reactive to light. Neck: Normal range of motion. Neck supple. Cardiovascular: Normal rate, regular rhythm and normal heart sounds. Exam reveals no gallop and no friction rub. No murmur heard. Pulses equal throughout.    
Pulmonary/Chest: Effort normal and breath sounds normal. No respiratory distress. She has no wheezes. Abdominal: Soft. Bowel sounds are normal. She exhibits no distension. There is generalized tenderness. There is no rebound and no guarding. Diffuse mild abdominal tenderness, worse over suprapubic region, without rebound or guarding. Musculoskeletal: Normal range of motion. Cervical back: She exhibits no bony tenderness. No midline C-spine tenderness. Neurological: She is alert and oriented to person, place, and time. Skin: Skin is warm. No rash noted. She is not diaphoretic. Psychiatric: She has a normal mood and affect. Her behavior is normal. Judgment and thought content normal.  
Nursing note and vitals reviewed. Note written by Kallie Sanchez, as dictated by Iban Malik MD 8:40 AM 
 
[facility] 
 
 
ED Course This is a 52-year-old female with past medical history, review of systems, physical exam as above, presenting with complaints of dizziness, probable fall, complaining so not eating or drinking for an unknown period of time, as well as no urine output or bowel movements for an unknown period of time. The patient is a poor historian, with difficulty with recall, states that she may have had a small bowel movement yesterday, endorses decreased p.o. intake. She she she began to feel dizzy this morning, when ambulating, attempted to lay down in the bed, and fell back, striking her head on a dresser without loss of consciousness. Physical exam remarkable for cachectic elderly female in no acute distress, with clear breath sounds, soft abdomen with generalized tenderness over the suprapubic space, clear breath sounds, C-spine without tenderness, no obvious head trauma, equal and reactive pupils, pulses equal throughout. Patient states she lives with her , who is debilitated secondary to chronic health issues. Suspect some component of inadequate home care.  Plan to provide pain control, obtain CMP, CBC, UA, cardiac enzymes, EKG, head CT, C-spine CT, CT of the abdomen and pelvis, chest x-ray. Will consult case management, and PT. We will make a disposition based the patient's diagnostics and response to therapy. Procedures ED EKG interpretation: 
Rhythm: normal sinus rhythm; and regular . Rate (approx.): 79 bpm; Axis: normal; Hyperacute T waves in V2 only. Note written by Kallie Ross, as dictated by Diana Shay MD 9:00 AM  
 
 12:03 PM 
Patient with unremarkable lab work and imaging, evaluated by PT, CM, Senior Services, contacted Tom who has also extensively evaluated the patient. Patient has PCP and psych f/u next week, considering HH vs. placement.

## 2018-07-27 NOTE — SENIOR SERVICES NOTE
physical Therapy Emergency Department EVALUATION/DISCHARGE Patient: Cameron Golden (30 y.o. female) Date: 7/27/2018 Primary Diagnosis: Dizziness Precautions:     
ASSESSMENT : 
Based on the objective data described below, the patient presents with what appears to be pt's recent baseline level of independence and functional mobility following admission for dizziness and a fall at home. Pt with multiple admission over the past nine months as her and her spouse report significant difficulty in having regular BMs and being unable to eat associated with weakness and weight loss. Pt and her  difficult to communicate with d/t both talking over each other and not answering questions but asking other questions. Redirected multiple times in order to gain subjective history. Pt's  recently had surgery to remove a tumor from his kidney. Pt reports she is too weak to get out of bed and go to the bathroom, she has been unable to bathe in weeks, and can not drive. Pt reports multiple times she does not know how she will be able to eat, cook, get up the stairs or go to the bathroom. Despite all of pt's complaints she demonstrated SUP to Mod I levels with bed mobility, transferred to standing without AD and ambulated 225 to bathroom and back to stretcher bed. No LOB or weakness noted while ambulating to the bathroom. Pt was able to manage brief and shorts and perform hygiene with INDEP. Pt voided for greater than 30 seconds, no BM. Ambulated back to room with SUP. During walk pt once again stating I don't know how I am going to go to the bathroom at home. \"I can't poop or pee and I can't eat. \" Redirected pt to the fact that she did just indeed void in the bathroom. Pt reports, \"but that was out of the back I still have not been able to pee. \" Cognitive/psych issues? Pt left in bed with  present.   
Returned later this afternoon at request of senior services liaison and NP as pt reports she will not be able to get up the steps at home. Pt once again ambulated an additional 400ft to steps without AD and ascended/descended 3 steps with one railing per home set up. No difficulty with steps. Pt returned to room and left in bed in no apparent distress. Pt is at her baseline and requires no additional therapy services acutely. Due to pt's high anxiety would highly recommend home health safety evaluation upon discharge. Pt is also working with case management in order to provide resources for private assistance at home. Further acute physical therapy is not indicated at this time. PLAN : 
Discharge Recommendations:  
 
[x]   Home with family []   Skilled nursing facility []   Admission to hospital with rehab likely needed 
[]   Inpatient rehab referral 
[]   Outpatient physical therapy referral 
[x]   Other: Home health safety evaluation Further Equipment Recommendations for Discharge:  
[]   Rolling walker with 5\" wheels 
[]   Crutches  
[]   Cane  
[]   Wheelchair  
[]   Other: COMMUNICATION/EDUCATION:  
Communication/Collaboration: 
[x]   Fall prevention education was provided and the patient/caregiver indicated understanding. [x]   Patient/family have participated as able and agree with findings and recommendations. []   Patient is unable to participate in plan of care at this time. Findings and recommendations were discussed with: MD physician and SSED CM, SSED NP  
 
 
SUBJECTIVE:  
Patient stated I can not pee or poop and I can not eat and nothing is working.  OBJECTIVE DATA SUMMARY:  
HISTORY:   
Past Medical History:  
Diagnosis Date  Acid reflux  Arthritis  Constipation  Diarrhea  Elevated cholesterol Past Surgical History:  
Procedure Laterality Date  HX HYSTERECTOMY Prior Level of Function/Home Situation: Lives with  and supposedly 2-3 other individuals. 3 steps to enter. Pt reports she was not bathing Personal factors and/or comorbidities impacting plan of care: constipation, arthritis, anxiety Home Situation Home Environment: Private residence # Steps to Enter: 3 Rails to Enter: Yes Hand Rails : Right One/Two Story Residence: One story Living Alone: No 
Support Systems: Spouse/Significant Other/Partner, Other (comments) (pt reports there are 2-3 others living in the home) Patient Expects to be Discharged to[de-identified] Private residence Current DME Used/Available at Home: None Tub or Shower Type: Tub EXAMINATION/PRESENTATION/DECISION MAKING:  
Critical Behavior: 
Neurologic State: Alert Orientation Level: Oriented X4 Cognition: Appropriate decision making, Appropriate for age attention/concentration, Appropriate safety awareness, Follows commands, Recognition of people/places Hearing: Auditory Auditory Impairment: None Skin:   
Range Of Motion: 
AROM: Within functional limits Strength:   
Strength: Generally decreased, functional 
  
  
  
  
  
  
Tone & Sensation:  
Tone: Normal 
  
  
  
  
Sensation: Impaired (bilateral feet numb) Coordination: 
Coordination: Within functional limits Vision:  
  
Functional Mobility: 
Bed Mobility: 
Rolling: Supervision Supine to Sit: Contact guard assistance Sit to Supine: Supervision Transfers: 
Sit to Stand: Stand-by assistance Stand to Sit: Stand-by assistance Balance:  
Sitting: Intact Standing: Intact; Without support; With support Ambulation/Gait Training: 
Distance (ft): 500 Feet (ft) Assistive Device: Gait belt Ambulation - Level of Assistance: Supervision Gait Abnormalities: Decreased step clearance Base of Support: Narrowed Speed/María: Pace decreased (<100 feet/min); Slow Step Length: Right shortened;Left shortened Special Tests: 
Barthel Index: 
 
Bathin Bladder: 10 Bowels: 10 
Groomin Dressin Feeding: 10 Mobility: 10 Stairs: 10 Toilet Use: 10 Transfer (Bed to Chair and Back): 10 Total: 85 Barthel and G-code impairment scale: 
Percentage of impairment CH 
0% CI 
1-19% CJ 
20-39% CK 
40-59% CL 
60-79% CM 
80-99% CN 
100% Barthel Score 0-100 100 99-80 79-60 59-40 20-39 1-19 
 0 Barthel Score 0-20 20 17-19 13-16 9-12 5-8 1-4 0 The Barthel ADL Index: Guidelines 1. The index should be used as a record of what a patient does, not as a record of what a patient could do. 2. The main aim is to establish degree of independence from any help, physical or verbal, however minor and for whatever reason. 3. The need for supervision renders the patient not independent. 4. A patient's performance should be established using the best available evidence. Asking the patient, friends/relatives and nurses are the usual sources, but direct observation and common sense are also important. However direct testing is not needed. 5. Usually the patient's performance over the preceding 24-48 hours is important, but occasionally longer periods will be relevant. 6. Middle categories imply that the patient supplies over 50 per cent of the effort. 7. Use of aids to be independent is allowed. John Bonilla., Barthel, D.W. (7736). Functional evaluation: the Barthel Index. [facility] (14)2. [facility] russell SOLOMON Arteaga, Ashlee Ford., Henna Nigel.[facility], [address] [place] [address] (1999). Measuring the change indisability after inpatient rehabilitation; comparison of the responsiveness of the Barthel Index and Functional [place] Measure. Journal of Neurology, Neurosurgery, and Psychiatry, 66(4), 471-609. Wu Sun, N.J.A, MILAGROS Hdz, & Herbert Alcaraz, M.A. (2004.) Assessment of post-stroke quality of life in cost-effectiveness studies: The usefulness of the Barthel Index and the EuroQoL-5D. [facility], 13, 371-61 In compliance with CMSs Claims Based Outcome Reporting, the following G-code set was chosen for this patient based on their primary functional limitation being treated: The outcome measure chosen to determine the severity of the functional limitation was the Barthel with a score of 85/100 which was correlated with the impairment scale. ? Mobility - Walking and Moving Around:  
  - CURRENT STATUS: CI - 1%-19% impaired, limited or restricted  - GOAL STATUS: CI - 1%-19% impaired, limited or restricted  - D/C STATUS:  CI - 1%-19% impaired, limited or restricted Physical Therapy Evaluation Charge Determination History Examination Presentation Decision-Making MEDIUM  Complexity : 1-2 comorbidities / personal factors will impact the outcome/ POC  MEDIUM Complexity : 3 Standardized tests and measures addressing body structure, function, activity limitation and / or participation in recreation  LOW Complexity : Stable, uncomplicated  Other outcome measures Barthel  LOW Based on the above components, the patient evaluation is determined to be of the following complexity level: LOW Therapeutic Exercises:  
 
Pain: 
Pain Scale 1: Numeric (0 - 10) Pain Intensity 1: 0 Activity Tolerance:  
Good Please refer to the flowsheet for vital signs taken during this treatment. After treatment:  
[]         Patient left in no apparent distress sitting up in chair 
[x]         Patient left in no apparent distress in bed 
[x]         Call bell left within reach [x]         Nursing notified 
[x]         Caregiver present 
[]         Bed alarm activated Thank you for this referral. 
Karuna Bateman, PT Time Calculation: 50 mins

## 2018-07-27 NOTE — ED TRIAGE NOTES
TRIAGE NOTE: Patient arrives via EMS from home. C/o dizziness this morning which caused to GLF. C/o generalized weakness, denies pain/injury from fall. Denies headache, numbness/tingling. Patient reports not eating or drinking or having BM \"in a while\".

## 2018-07-27 NOTE — ED NOTES
Bedside report given to Elier Kemp RN and Sandhya Mukherjee RN who will assume care at this time. Report included content from SBAR, [facility], Recent Results and ED Summary. RN aware of physical assessment and resulted/pending lab/radiology studies. A period of questions and answers was afforded.

## 2018-07-28 LAB
ATRIAL RATE: 80 BPM
CALCULATED R AXIS, ECG10: 10 DEGREES
CALCULATED T AXIS, ECG11: 45 DEGREES
DIAGNOSIS, 93000: NORMAL
P-R INTERVAL, ECG05: 160 MS
Q-T INTERVAL, ECG07: 360 MS
QRS DURATION, ECG06: 76 MS
QTC CALCULATION (BEZET), ECG08: 412 MS
VENTRICULAR RATE, ECG03: 79 BPM

## 2018-07-28 NOTE — DISCHARGE INSTRUCTIONS
Weakness: Care Instructions  Your Care Instructions    Weakness is a lack of physical or muscle strength. You may feel that you need to make extra effort to move your arms, legs, or other muscles. Generalized weakness means that you feel weak in most areas of your body. Another type of weakness may affect just one muscle or group of muscles. You may feel weak and tired after you have done too much activity, such as taking an extra-long hike. This is not a serious problem. It often goes away on its own. Feeling weak can also be caused by medical conditions like thyroid problems, depression, or a virus. Sometimes the cause can be serious. Your doctor may want to do more tests to try to find the cause of the weakness. The doctor has checked you carefully, but problems can develop later. If you notice any problems or new symptoms, get medical treatment right away. Follow-up care is a key part of your treatment and safety. Be sure to make and go to all appointments, and call your doctor if you are having problems. It's also a good idea to know your test results and keep a list of the medicines you take. How can you care for yourself at home? · Rest when you feel tired. · Be safe with medicines. If your doctor prescribed medicine, take it exactly as prescribed. Call your doctor if you think you are having a problem with your medicine. You will get more details on the specific medicines your doctor prescribes. · Do not skip meals. Eating a balanced diet may increase your energy level. · Get some physical activity every day, but do not get too tired. When should you call for help? Call your doctor now or seek immediate medical care if:    · You have new or worse weakness.     · You are dizzy or lightheaded, or you feel like you may faint.    Watch closely for changes in your health, and be sure to contact your doctor if:    · You do not get better as expected. Where can you learn more?   Go to http://chris.info/. Enter [id number] in the search box to learn more about \"Weakness: Care Instructions. \"  Current as of: November 20, 2017  Content Version: 11.7  © 7193-2011 [employer]. Care instructions adapted under license by [employer] (which disclaims liability or warranty for this information). If you have questions about a medical condition or this instruction, always ask your healthcare professional. [address] any warranty or liability for your use of this information.

## 2018-07-30 ENCOUNTER — TELEPHONE (OUTPATIENT)
Dept: CASE MANAGEMENT | Age: 78
End: 2018-07-30

## 2018-07-30 NOTE — TELEPHONE ENCOUNTER
F/U call placed to [facility] - spoke w/ Florence Perez provided updates - need MOW application status and MSW to assess home needs. Call received back from GONZALEZ Castañeda off until 8/6/18 will provide updates in EMR. Amilcar Chavez does not have another MSW - informed case discussed w/ Nurse CR on 7/27/18.

## 2018-08-06 ENCOUNTER — HOSPITAL ENCOUNTER (INPATIENT)
Age: 78
LOS: 1 days | Discharge: NURSING, REHAB OR LONG-TERM CARE | DRG: 880 | End: 2018-08-09
Attending: EMERGENCY MEDICINE | Admitting: HOSPITALIST
Payer: MEDICARE

## 2018-08-06 ENCOUNTER — APPOINTMENT (OUTPATIENT)
Dept: GENERAL RADIOLOGY | Age: 78
DRG: 880 | End: 2018-08-06
Attending: EMERGENCY MEDICINE
Payer: MEDICARE

## 2018-08-06 ENCOUNTER — APPOINTMENT (OUTPATIENT)
Dept: CT IMAGING | Age: 78
DRG: 880 | End: 2018-08-06
Attending: EMERGENCY MEDICINE
Payer: MEDICARE

## 2018-08-06 DIAGNOSIS — R63.4 WEIGHT LOSS: ICD-10-CM

## 2018-08-06 DIAGNOSIS — F41.9 ANXIETY: ICD-10-CM

## 2018-08-06 DIAGNOSIS — R47.01 APHASIA: Primary | ICD-10-CM

## 2018-08-06 LAB
ALBUMIN SERPL-MCNC: 4 G/DL (ref 3.5–5)
ALBUMIN/GLOB SERPL: 1.2 {RATIO} (ref 1.1–2.2)
ALP SERPL-CCNC: 72 U/L (ref 45–117)
ALT SERPL-CCNC: 22 U/L (ref 12–78)
ANION GAP SERPL CALC-SCNC: 7 MMOL/L (ref 5–15)
APPEARANCE UR: CLEAR
AST SERPL-CCNC: 57 U/L (ref 15–37)
ATRIAL RATE: 92 BPM
BACTERIA URNS QL MICRO: NEGATIVE /HPF
BASOPHILS # BLD: 0 K/UL (ref 0–0.1)
BASOPHILS NFR BLD: 0 % (ref 0–1)
BILIRUB SERPL-MCNC: 0.9 MG/DL (ref 0.2–1)
BILIRUB UR QL: NEGATIVE
BUN SERPL-MCNC: 30 MG/DL (ref 6–20)
BUN/CREAT SERPL: 37 (ref 12–20)
CALCIUM SERPL-MCNC: 10 MG/DL (ref 8.5–10.1)
CALCULATED R AXIS, ECG10: 2 DEGREES
CALCULATED T AXIS, ECG11: 46 DEGREES
CHLORIDE SERPL-SCNC: 104 MMOL/L (ref 97–108)
CO2 SERPL-SCNC: 30 MMOL/L (ref 21–32)
COLOR UR: ABNORMAL
CREAT SERPL-MCNC: 0.81 MG/DL (ref 0.55–1.02)
DIAGNOSIS, 93000: NORMAL
DIFFERENTIAL METHOD BLD: ABNORMAL
EOSINOPHIL # BLD: 0.1 K/UL (ref 0–0.4)
EOSINOPHIL NFR BLD: 0 % (ref 0–7)
EPITH CASTS URNS QL MICRO: ABNORMAL /LPF
ERYTHROCYTE [DISTWIDTH] IN BLOOD BY AUTOMATED COUNT: 12.4 % (ref 11.5–14.5)
GLOBULIN SER CALC-MCNC: 3.4 G/DL (ref 2–4)
GLUCOSE BLD STRIP.AUTO-MCNC: 102 MG/DL (ref 65–100)
GLUCOSE SERPL-MCNC: 118 MG/DL (ref 65–100)
GLUCOSE UR STRIP.AUTO-MCNC: NEGATIVE MG/DL
HCT VFR BLD AUTO: 48.5 % (ref 35–47)
HGB BLD-MCNC: 15.7 G/DL (ref 11.5–16)
HGB UR QL STRIP: NEGATIVE
HYALINE CASTS URNS QL MICRO: ABNORMAL /LPF (ref 0–5)
IMM GRANULOCYTES # BLD: 0 K/UL (ref 0–0.04)
IMM GRANULOCYTES NFR BLD AUTO: 0 % (ref 0–0.5)
KETONES UR QL STRIP.AUTO: ABNORMAL MG/DL
LEUKOCYTE ESTERASE UR QL STRIP.AUTO: NEGATIVE
LYMPHOCYTES # BLD: 1.6 K/UL (ref 0.8–3.5)
LYMPHOCYTES NFR BLD: 14 % (ref 12–49)
MCH RBC QN AUTO: 29.8 PG (ref 26–34)
MCHC RBC AUTO-ENTMCNC: 32.4 G/DL (ref 30–36.5)
MCV RBC AUTO: 92.2 FL (ref 80–99)
MONOCYTES # BLD: 0.9 K/UL (ref 0–1)
MONOCYTES NFR BLD: 8 % (ref 5–13)
NEUTS SEG # BLD: 8.8 K/UL (ref 1.8–8)
NEUTS SEG NFR BLD: 77 % (ref 32–75)
NITRITE UR QL STRIP.AUTO: NEGATIVE
NRBC # BLD: 0 K/UL (ref 0–0.01)
NRBC BLD-RTO: 0 PER 100 WBC
P-R INTERVAL, ECG05: 144 MS
PH UR STRIP: 6.5 [PH] (ref 5–8)
PLATELET # BLD AUTO: 220 K/UL (ref 150–400)
PMV BLD AUTO: 11.8 FL (ref 8.9–12.9)
POTASSIUM SERPL-SCNC: 4 MMOL/L (ref 3.5–5.1)
PROT SERPL-MCNC: 7.4 G/DL (ref 6.4–8.2)
PROT UR STRIP-MCNC: NEGATIVE MG/DL
Q-T INTERVAL, ECG07: 356 MS
QRS DURATION, ECG06: 76 MS
QTC CALCULATION (BEZET), ECG08: 440 MS
RBC # BLD AUTO: 5.26 M/UL (ref 3.8–5.2)
RBC #/AREA URNS HPF: ABNORMAL /HPF (ref 0–5)
SERVICE CMNT-IMP: ABNORMAL
SODIUM SERPL-SCNC: 141 MMOL/L (ref 136–145)
SP GR UR REFRACTOMETRY: 1.02 (ref 1–1.03)
TSH SERPL DL<=0.05 MIU/L-ACNC: 0.75 UIU/ML (ref 0.36–3.74)
UR CULT HOLD, URHOLD: NORMAL
UROBILINOGEN UR QL STRIP.AUTO: 0.2 EU/DL (ref 0.2–1)
VENTRICULAR RATE, ECG03: 92 BPM
WBC # BLD AUTO: 11.4 K/UL (ref 3.6–11)
WBC URNS QL MICRO: ABNORMAL /HPF (ref 0–4)

## 2018-08-06 PROCEDURE — 99285 EMERGENCY DEPT VISIT HI MDM: CPT

## 2018-08-06 PROCEDURE — 71045 X-RAY EXAM CHEST 1 VIEW: CPT

## 2018-08-06 PROCEDURE — 99218 HC RM OBSERVATION: CPT

## 2018-08-06 PROCEDURE — 80053 COMPREHEN METABOLIC PANEL: CPT | Performed by: EMERGENCY MEDICINE

## 2018-08-06 PROCEDURE — 36415 COLL VENOUS BLD VENIPUNCTURE: CPT | Performed by: EMERGENCY MEDICINE

## 2018-08-06 PROCEDURE — 74011250636 HC RX REV CODE- 250/636: Performed by: HOSPITALIST

## 2018-08-06 PROCEDURE — 84443 ASSAY THYROID STIM HORMONE: CPT | Performed by: EMERGENCY MEDICINE

## 2018-08-06 PROCEDURE — 85025 COMPLETE CBC W/AUTO DIFF WBC: CPT | Performed by: EMERGENCY MEDICINE

## 2018-08-06 PROCEDURE — 82962 GLUCOSE BLOOD TEST: CPT

## 2018-08-06 PROCEDURE — 81001 URINALYSIS AUTO W/SCOPE: CPT | Performed by: EMERGENCY MEDICINE

## 2018-08-06 PROCEDURE — 74011250637 HC RX REV CODE- 250/637: Performed by: HOSPITALIST

## 2018-08-06 PROCEDURE — 93005 ELECTROCARDIOGRAM TRACING: CPT

## 2018-08-06 PROCEDURE — 70450 CT HEAD/BRAIN W/O DYE: CPT

## 2018-08-06 RX ORDER — INSULIN LISPRO 100 [IU]/ML
INJECTION, SOLUTION INTRAVENOUS; SUBCUTANEOUS
Status: DISCONTINUED | OUTPATIENT
Start: 2018-08-06 | End: 2018-08-08

## 2018-08-06 RX ORDER — IBUPROFEN 400 MG/1
400 TABLET ORAL
Status: DISCONTINUED | OUTPATIENT
Start: 2018-08-06 | End: 2018-08-09 | Stop reason: HOSPADM

## 2018-08-06 RX ORDER — SODIUM CHLORIDE 0.9 % (FLUSH) 0.9 %
5-10 SYRINGE (ML) INJECTION AS NEEDED
Status: DISCONTINUED | OUTPATIENT
Start: 2018-08-06 | End: 2018-08-09 | Stop reason: HOSPADM

## 2018-08-06 RX ORDER — CLONAZEPAM 0.5 MG/1
0.5 TABLET ORAL
COMMUNITY
End: 2019-01-04 | Stop reason: ALTCHOICE

## 2018-08-06 RX ORDER — MIRTAZAPINE 15 MG/1
15 TABLET, FILM COATED ORAL
COMMUNITY
End: 2018-08-09

## 2018-08-06 RX ORDER — ATORVASTATIN CALCIUM 80 MG/1
80 TABLET, FILM COATED ORAL DAILY
COMMUNITY

## 2018-08-06 RX ORDER — GUAIFENESIN 100 MG/5ML
81 LIQUID (ML) ORAL DAILY
Status: DISCONTINUED | OUTPATIENT
Start: 2018-08-07 | End: 2018-08-09 | Stop reason: HOSPADM

## 2018-08-06 RX ORDER — SODIUM CHLORIDE 9 MG/ML
75 INJECTION, SOLUTION INTRAVENOUS CONTINUOUS
Status: DISCONTINUED | OUTPATIENT
Start: 2018-08-06 | End: 2018-08-09 | Stop reason: HOSPADM

## 2018-08-06 RX ORDER — DEXTROSE 50 % IN WATER (D50W) INTRAVENOUS SYRINGE
12.5-25 AS NEEDED
Status: DISCONTINUED | OUTPATIENT
Start: 2018-08-06 | End: 2018-08-09 | Stop reason: HOSPADM

## 2018-08-06 RX ORDER — MIRTAZAPINE 15 MG/1
15 TABLET, FILM COATED ORAL
Status: DISCONTINUED | OUTPATIENT
Start: 2018-08-06 | End: 2018-08-07

## 2018-08-06 RX ORDER — MAGNESIUM SULFATE 100 %
4 CRYSTALS MISCELLANEOUS AS NEEDED
Status: DISCONTINUED | OUTPATIENT
Start: 2018-08-06 | End: 2018-08-09 | Stop reason: HOSPADM

## 2018-08-06 RX ORDER — SODIUM CHLORIDE 0.9 % (FLUSH) 0.9 %
5-10 SYRINGE (ML) INJECTION EVERY 8 HOURS
Status: DISCONTINUED | OUTPATIENT
Start: 2018-08-06 | End: 2018-08-09 | Stop reason: HOSPADM

## 2018-08-06 RX ORDER — GUAIFENESIN 100 MG/5ML
81 LIQUID (ML) ORAL DAILY
COMMUNITY

## 2018-08-06 RX ORDER — ATORVASTATIN CALCIUM 40 MG/1
80 TABLET, FILM COATED ORAL DAILY
Status: DISCONTINUED | OUTPATIENT
Start: 2018-08-07 | End: 2018-08-09 | Stop reason: HOSPADM

## 2018-08-06 RX ORDER — ONDANSETRON 2 MG/ML
4 INJECTION INTRAMUSCULAR; INTRAVENOUS
Status: DISCONTINUED | OUTPATIENT
Start: 2018-08-06 | End: 2018-08-09 | Stop reason: HOSPADM

## 2018-08-06 RX ORDER — CLONAZEPAM 1 MG/1
0.5 TABLET ORAL
Status: DISCONTINUED | OUTPATIENT
Start: 2018-08-06 | End: 2018-08-09 | Stop reason: HOSPADM

## 2018-08-06 RX ORDER — ENOXAPARIN SODIUM 100 MG/ML
40 INJECTION SUBCUTANEOUS EVERY 24 HOURS
Status: DISCONTINUED | OUTPATIENT
Start: 2018-08-06 | End: 2018-08-09 | Stop reason: HOSPADM

## 2018-08-06 RX ORDER — PANTOPRAZOLE SODIUM 40 MG/1
40 TABLET, DELAYED RELEASE ORAL 2 TIMES DAILY
Status: DISCONTINUED | OUTPATIENT
Start: 2018-08-06 | End: 2018-08-09 | Stop reason: HOSPADM

## 2018-08-06 RX ADMIN — PANTOPRAZOLE SODIUM 40 MG: 40 TABLET, DELAYED RELEASE ORAL at 22:06

## 2018-08-06 RX ADMIN — MIRTAZAPINE 15 MG: 15 TABLET, FILM COATED ORAL at 22:06

## 2018-08-06 RX ADMIN — Medication 10 ML: at 22:06

## 2018-08-06 RX ADMIN — CLONAZEPAM 0.5 MG: 1 TABLET ORAL at 22:17

## 2018-08-06 RX ADMIN — SODIUM CHLORIDE 75 ML/HR: 900 INJECTION, SOLUTION INTRAVENOUS at 22:05

## 2018-08-06 RX ADMIN — ENOXAPARIN SODIUM 40 MG: 100 INJECTION SUBCUTANEOUS at 22:06

## 2018-08-06 NOTE — IP AVS SNAPSHOT
[address] [facility] 
[phone] Patient: Zenobia Tai MRN: [id number] About your hospitalization You were admitted on:  August 6, 2018 You last received care in the:  [facility] 6S NEURO-SCI TELE You were discharged on:  August 9, 2018 Why you were hospitalized Your primary diagnosis was:  Aphasia Follow-up Information Follow up With Details Comments Contact Info [address] [place] [address]   [address] 
[phone] Shruthi Torres MD   [address] [facility] [address] [address] 
[phone] Discharge Orders None A check nathaniel indicates which time of day the medication should be taken. My Medications START taking these medications Instructions Each Dose to Equal  
 Morning Noon Evening Bedtime PARoxetine hcl 10 mg/5 mL suspension Commonly known as:  PAXIL Start taking on:  8/10/2018 Your last dose was: Your next dose is: Take 2.5 mL by mouth daily. Indications: ANXIETY WITH DEPRESSION  
 5 mg CONTINUE taking these medications Instructions Each Dose to Equal  
 Morning Noon Evening Bedtime  
 aspirin 81 mg chewable tablet Your last dose was: Your next dose is: Take 81 mg by mouth daily. 81 mg  
    
   
   
   
  
 atorvastatin 80 mg tablet Commonly known as:  LIPITOR Your last dose was: Your next dose is: Take 80 mg by mouth daily. 80 mg  
    
   
   
   
  
 calcium-vitamin D 500 mg(1,250mg) -200 unit per tablet Commonly known as:  OYSTER SHELL Your last dose was: Your next dose is: Take 1 Tab by mouth two (2) times daily (with meals). 1 Tab  
    
   
   
   
  
 clonazePAM 0.5 mg tablet Commonly known as:  Cat Aw Your last dose was: Your next dose is: Take 0.5 mg by mouth nightly as needed. 0.5 mg  
    
   
   
   
  
 lactulose 10 gram/15 mL solution Commonly known as:  Rafita Melcher [place] Your last dose was: Your next dose is: Take 10 g by mouth daily (after breakfast). 10 g  
    
   
   
   
  
 pantoprazole 40 mg tablet Commonly known as:  PROTONIX Your last dose was: Your next dose is: Take 40 mg by mouth two (2) times a day. 40 mg  
    
   
   
   
  
 VITAMIN B12 PO Your last dose was: Your next dose is: Take 1 Tab by mouth daily. Strength unknown 1 Tab STOP taking these medications   
 mirtazapine 15 mg tablet Commonly known as:  Collazoradames Gamezter Where to Get Your Medications Information on where to get these meds will be given to you by the nurse or doctor. ! Ask your nurse or doctor about these medications PARoxetine hcl 10 mg/5 mL suspension Discharge Instructions None Printed Piece Announcement We are excited to announce that we are making your provider's discharge notes available to you in Printed Piece. You will see these notes when they are completed and signed by the physician that discharged you from your recent hospital stay. If you have any questions or concerns about any information you see in Printed Piece, please call the Health Information Department where you were seen or reach out to your Primary Care Provider for more information about your plan of care. Introducing [facility]! [employer] introduces Printed Piece patient portal. Now you can access parts of your medical record, email your doctor's office, and request medication refills online. 1. In your internet browser, go to [email or web address] 2. Click on the First Time User? Click Here link in the Sign In box. You will see the New Member Sign Up page. 3. Enter your Eth[employer] Networkst Access Code exactly as it appears below. You will not need to use this code after youve completed the sign-up process. If you do not sign up before the expiration date, you must request a new code. · StillSecure Access Code: [facility] Expires: 11/4/2018  5:48 PM 
 
4. Enter the last four digits of your Social Security Number (xxxx) and Date of Birth (mm/dd/yyyy) as indicated and click Submit. You will be taken to the next sign-up page. 5. Create a Eth[employer] Networkst ID. This will be your StillSecure login ID and cannot be changed, so think of one that is secure and easy to remember. 6. Create a [employer] password. You can change your password at any time. 7. Enter your Password Reset Question and Answer. This can be used at a later time if you forget your password. 8. Enter your e-mail address. You will receive e-mail notification when new information is available in [facility] [address] [facility] [address]. 9. Click Sign Up. You can now view and download portions of your medical record. 10. Click the Download Summary menu link to download a portable copy of your medical information. If you have questions, please visit the Frequently Asked Questions section of the StillSecure website. Remember, StillSecure is NOT to be used for urgent needs. For medical emergencies, dial 911. Now available from your iPhone and Android! Introducing Shaheen Rendon As a [employer] patient, I wanted to make you aware of our electronic visit tool called Shaheen Rendon. [employer] 24/7 allows you to connect within minutes with a medical provider 24 hours a day, seven days a week via a mobile device or tablet or logging into a secure website from your computer. You can access Shaheen Rendon from anywhere in the [place].  
 
A virtual visit might be right for you when you have a simple condition and feel like you just dont want to get out of bed, or cant get away from work for an appointment, when your regular [facility] provider is not available (evenings, weekends or holidays), or when youre out of town and need minor care. Electronic visits cost only $49 and if the [employer] 24/[place] provider determines a prescription is needed to treat your condition, one can be electronically transmitted to a nearby pharmacy*. Please take a moment to enroll today if you have not already done so. The enrollment process is free and takes just a few minutes. To enroll, please download the [place]/[place] rishabh to your tablet or phone, or visit [email or web address] to enroll on your computer. And, as an [address] [place] [address] patient with a [employer] account, the results of your visits will be scanned into your electronic medical record and your primary care provider will be able to view the scanned results. We urge you to continue to see your regular [facility] provider for your ongoing medical care. And while your primary care provider may not be the one available when you seek a Odysii virtual visit, the peace of mind you get from getting a real diagnosis real time can be priceless. For more information on Odysii, view our Frequently Asked Questions (FAQs) at [email or web address]. Sincerely, 
 
Karen Guzman MD 
Chief Medical Officer [facility] Sarahi Gerber *:  certain medications cannot be prescribed via Odysii Providers Seen During Your Hospitalization Provider Specialty Primary office phone Julia Elliott MD Emergency Medicine [phone] Juan Francisco Mcdermott MD Hospitalist [phone] Boom Downey MD Internal Medicine [phone] Yahaira Gould MD Family Practice [phone] Your Primary Care Physician (PCP) Primary Care Physician Office Phone Office Fax Palma Weems [phone] You are allergic to the following Allergen Reactions Tylenol (Acetaminophen) Rash Other (comments) \"it's just too strong for me\" Recent Documentation Height Weight BMI OB Status Smoking Status 1.524 m 49.5 kg 21.31 kg/m2 Hysterectomy Never Smoker Emergency Contacts Name Discharge Info Relation Home Work Mobile Matthew Palu DISCHARGE CAREGIVER [3] Spouse [3] [phone] Mariluz Marte  Daughter [21]   [phone] Patient Belongings The following personal items are in your possession at time of discharge: 
  Dental Appliances: None         Home Medications: Sent home   Jewelry: Bracelet, With patient  Clothing: At bedside    Other Valuables: None Please provide this summary of care documentation to your next provider. Signatures-by signing, you are acknowledging that this After Visit Summary has been reviewed with you and you have received a copy. Patient Signature:  ____________________________________________________________ Date:  ____________________________________________________________  
  
Rupinder Reyes Provider Signature:  ____________________________________________________________ Date:  ____________________________________________________________

## 2018-08-06 NOTE — ED NOTES
Pt does not appear tremorous anymore. VSS. Call bell within reach.  at the bedside. Will continue to monitor.

## 2018-08-06 NOTE — ED NOTES
Pt continues to groan. When asked if she was feeling better, pt shook head. Pt asked what was wrong and she said \"pain. \" when asked pt to elaborate on where she felt pain, pt become aphasic again. Explained to pt that we needed to straight cath for urine. Pt tolerated straight cath well. 150 mls of dark yellow urine noted. Urine sent up for UA.

## 2018-08-06 NOTE — ED NOTES
reports that pt had stopped taking medications for the past 3 weeks, since decrease in appetite, and is only taking her cholesterol medication every other night and ASA daily.

## 2018-08-06 NOTE — ED TRIAGE NOTES
Pt arrives to ED from home via EMS with complaints of pt \"shaking and stopped responding\" 1 hr pta to EMS arrival. Pt is tremorous on all extremities with R>L. Pt looking around, makes occasional eye contact but does not respond to any questions asked. Pt occasionally grunting. Per EMS, pt has had weight loss for the past couple of months with decreased appetite and drinking in past week. Last BM 1 week ago per .     Code S called.  Pt to CT with primary RN and Dr. Hilda Pena upon arrival.

## 2018-08-06 NOTE — ED NOTES
1945: Assumed care of pt. Report received from Robert, [facility]. VSS,  remains at beside. 2050: Patient left department for transportation to inpatient bed. Patient's VS at the time of transfer were /75, 95 on RA, denies pain at this time, 98.7 orally, HR 80 sinus rhythm on the monitor. Patient was alert and nonverbal and denies further needs at time of transfer. Patient's family went home prior to transfer to floor. TRANSFER - OUT REPORT:    Verbal report given to GERARDO Alexander(name) on Colletta Montenegro  being transferred to [facility](unit) for routine progression of care       Report consisted of patients Situation, Background, Assessment and   Recommendations(SBAR). Information from the following report(s) SBAR, Kardex, ED Summary, [facility] and Recent Results was reviewed with the receiving nurse. Opportunity for questions and clarification was provided.

## 2018-08-06 NOTE — ED NOTES
Bedside and Verbal shift change report given to Lily Monroe RN (oncoming nurse) by Viki Pearson RN (offgoing nurse). Report included the following information SBAR, ED Summary, MAR and Recent Results.

## 2018-08-06 NOTE — ED PROVIDER NOTES
HPI Comments: 69 y.o. female with past medical history significant for Hyperlipidemia, and Arthritis who presents via EMS with chief complaint of aphasia. Per , patient had sudden onset of \"shaking\" and patient \"stopped responding. \"  notes he called patient's PT and was referred to [facility] ED for further evaluation. Per EMS, en route to [facility] ED patient's blood glucose was 120 and was nonverbal. Upon examination at [facility] ED patient was nonverbal and had continued shaking. Per , patient has accompanying weakness, decreased appetite, and unexpected weight loss (42 pounds in 3-4 months). Per , patient was \"fine\" this morning and was able to talk normally and carry a conversation. Patient has been seen several times for aforementioned symptoms, performed multiple CT scans all of which were unremarkable. Per , patient has not been eating or drinking fluids well for the past week. Per , patient is unable to ambulate due to worsening weakness at baseline. PCP: Oneil Rodriguez MD    Note written by Kallie Pacheco, as dictated by Yon Rojas MD 7:02 PM      The history is provided by the spouse and the EMS personnel. Past Medical History:   Diagnosis Date    Acid reflux     Arthritis     Constipation     Diarrhea     Elevated cholesterol        Past Surgical History:   Procedure Laterality Date    HX HYSTERECTOMY           No family history on file. Social History     Social History    Marital status:      Spouse name: N/A    Number of children: N/A    Years of education: N/A     Occupational History    Not on file.      Social History Main Topics    Smoking status: Never Smoker    Smokeless tobacco: Never Used    Alcohol use No    Drug use: Not on file    Sexual activity: Not on file     Other Topics Concern    Not on file     Social History Narrative         ALLERGIES: Tylenol [acetaminophen]    Review of Systems   Unable to perform ROS: Patient nonverbal       Vitals:    08/06/18 1740   BP: (!) 164/95   Pulse: 96   Resp: 17   Temp: 99.2 °F (37.3 °C)   SpO2: 95%   Weight: 47.8 kg (105 lb 6.1 oz)   Height: 5' (1.524 m)            Physical Exam   Constitutional: She appears well-developed and well-nourished. HENT:   Head: Normocephalic and atraumatic. Mouth/Throat: Oropharynx is clear and moist.   Eyes: EOM are normal. Pupils are equal, round, and reactive to light. Neck: Normal range of motion. Neck supple. Cardiovascular: Normal rate, regular rhythm, normal heart sounds and intact distal pulses. Exam reveals no gallop and no friction rub. No murmur heard. Pulmonary/Chest: Effort normal. No respiratory distress. She has no wheezes. She has no rales. Abdominal: Soft. There is no tenderness. There is no rebound. Musculoskeletal: Normal range of motion. She exhibits no tenderness. Neurological: No cranial nerve deficit. Motor; symmetric. Unable to state name, unable to follow simple commands, awake, makes eye contact   Skin: No erythema. Psychiatric: She has a normal mood and affect. Her behavior is normal.   Nursing note and vitals reviewed. Note written by Kallie Montgomery, as dictated by Maty Carrasco MD 7:04 PM      [facility]      ED Course       Procedures      Note: Patient was working until 8 or 9 months ago. Since then she has been losing weight and becoming weaker. She apparently is unable to walk. Physical therapy was supposed to start working with her soon. According to her  she was talking this morning but now cannot talk at all. She had shaking of all 4 extremities. She makes eye contact and is awake but is not following commands or answering any of my questions. Workup has not revealed any acute obvious toxic metabolic issue. Her symptoms are nonfocal but a CVA is possible. Plan is to admit the patient for a stroke workup.  TSH is normal.  Maty Carrasco MD  7:07 PM         ED EKG interpretation:  Rhythm: normal sinus rhythm; and regular . Rate (approx.): 90; Axis: normal; P wave: normal; QRS interval: normal ; ST/T wave: normal; in  Lead: ; Other findings: . This EKG was interpreted by Zuly Flower MD,ED Provider.  9:39 PM

## 2018-08-06 NOTE — H&P
HISTORY AND PHYSICAL      PCP: Anette Nagel MD  History source: the patient's       CC: \"shaking\"      HPI: 69 y.o lady with hyperlipidemia who presented after an episode of generalized shaking. She is currently not speaking at all and doesn't provide any history. Her  says that earlier today she had shaking of her arms and legs, she didn't lose consciousness, he is unsure how long it lasted for. He states that after the shaking subsided, she stopped talking. She hasn't had any acute complaints recently. However, she's apparently had a 40 lb weight loss over the past 4 months. Her  also endorses that she has been under a lot of stress due to finances and other personal issues. PMH/PSH:  Past Medical History:   Diagnosis Date    Acid reflux     Arthritis     Constipation     Diarrhea     Elevated cholesterol      Past Surgical History:   Procedure Laterality Date    HX HYSTERECTOMY         Home meds:   Prior to Admission medications    Medication Sig Start Date End Date Taking? Authorizing Provider   aspirin 81 mg chewable tablet Take 81 mg by mouth daily. Yes Calvin Zepeda MD   atorvastatin (LIPITOR) 80 mg tablet Take 80 mg by mouth daily. Yes Calvin Zepeda MD   mirtazapine (REMERON) 15 mg tablet Take 15 mg by mouth nightly. Yes Calvin Zepeda MD   clonazePAM (KLONOPIN) 0.5 mg tablet Take 0.5 mg by mouth nightly as needed. Yes Calvin Zepeda MD   pantoprazole (PROTONIX) 40 mg tablet Take 40 mg by mouth two (2) times a day. Yes Historical Provider   lactulose (CHRONULAC) 10 gram/15 mL solution Take 10 g by mouth daily (after breakfast). Yes Historical Provider   cyanocobalamin, vitamin B-12, (VITAMIN B12 PO) Take 1 Tab by mouth daily. Strength unknown   Yes Historical Provider   calcium-vitamin D (OYSTER SHELL) 500 mg(1,250mg) -200 unit per tablet Take 1 Tab by mouth two (2) times daily (with meals). Yes Historical Provider       Allergies:   Allergies   Allergen Reactions  Tylenol [Acetaminophen] Rash and Other (comments)     \"it's just too strong for me\"       FH:  No family history on file. SH:  Social History   Substance Use Topics    Smoking status: Never Smoker    Smokeless tobacco: Never Used    Alcohol use No       ROS: Review of systems not obtained due to patient factors. PHYSICAL EXAM:  Visit Vitals    /88    Pulse 84    Temp 99.2 °F (37.3 °C)    Resp 15    Ht 5' (1.524 m)    Wt 47.8 kg (105 lb 6.1 oz)    SpO2 94%    BMI 20.58 kg/m2       Gen: NAD, non-toxic, thin  HEENT: anicteric sclerae, normal conjunctiva, oropharynx clear, MM moist  Neck: supple, trachea midline, no adenopathy  Heart: RRR, no MRG, no JVD, no peripheral edema  Lungs: CTA b/l, non-labored respirations  Abd: soft, NT, ND, BS+, no organomegaly  Extr: warm  Skin: dry, no rash  Neuro: CN II-XII grossly intact, not talking and doesn't even attempt to talk. Handgrip strength is variable on repeated attempts, follows some commands only. Exam findings are not consistent.   Psych: flat affect      Labs/Imaging:  Recent Results (from the past 24 hour(s))   GLUCOSE, POC    Collection Time: 08/06/18  5:15 PM   Result Value Ref Range    Glucose (POC) 102 (H) 65 - 100 mg/dL    Performed by Ashley Bauman    EKG, 12 LEAD, INITIAL    Collection Time: 08/06/18  5:32 PM   Result Value Ref Range    Ventricular Rate 92 BPM    Atrial Rate 92 BPM    P-R Interval 144 ms    QRS Duration 76 ms    Q-T Interval 356 ms    QTC Calculation (Bezet) 440 ms    Calculated R Axis 2 degrees    Calculated T Axis 46 degrees    Diagnosis       Normal sinus rhythm  When compared with ECG of 27-JUL-2018 08:46,  No significant change was found     CBC WITH AUTOMATED DIFF    Collection Time: 08/06/18  5:38 PM   Result Value Ref Range    WBC 11.4 (H) 3.6 - 11.0 K/uL    RBC 5.26 (H) 3.80 - 5.20 M/uL    HGB 15.7 11.5 - 16.0 g/dL    HCT 48.5 (H) 35.0 - 47.0 %    MCV 92.2 80.0 - 99.0 FL    MCH 29.8 26.0 - 34.0 PG    MCHC 32.4 30.0 - 36.5 g/dL    RDW 12.4 11.5 - 14.5 %    PLATELET 048 388 - 558 K/uL    MPV 11.8 8.9 - 12.9 FL    NRBC 0.0 0  WBC    ABSOLUTE NRBC 0.00 0.00 - 0.01 K/uL    NEUTROPHILS 77 (H) 32 - 75 %    LYMPHOCYTES 14 12 - 49 %    MONOCYTES 8 5 - 13 %    EOSINOPHILS 0 0 - 7 %    BASOPHILS 0 0 - 1 %    IMMATURE GRANULOCYTES 0 0.0 - 0.5 %    ABS. NEUTROPHILS 8.8 (H) 1.8 - 8.0 K/UL    ABS. LYMPHOCYTES 1.6 0.8 - 3.5 K/UL    ABS. MONOCYTES 0.9 0.0 - 1.0 K/UL    ABS. EOSINOPHILS 0.1 0.0 - 0.4 K/UL    ABS. BASOPHILS 0.0 0.0 - 0.1 K/UL    ABS. IMM. GRANS. 0.0 0.00 - 0.04 K/UL    DF AUTOMATED     METABOLIC PANEL, COMPREHENSIVE    Collection Time: 08/06/18  5:38 PM   Result Value Ref Range    Sodium 141 136 - 145 mmol/L    Potassium 4.0 3.5 - 5.1 mmol/L    Chloride 104 97 - 108 mmol/L    CO2 30 21 - 32 mmol/L    Anion gap 7 5 - 15 mmol/L    Glucose 118 (H) 65 - 100 mg/dL    BUN 30 (H) 6 - 20 MG/DL    Creatinine 0.81 0.55 - 1.02 MG/DL    BUN/Creatinine ratio 37 (H) 12 - 20      GFR est AA >60 >60 ml/min/1.73m2    GFR est non-AA >60 >60 ml/min/1.73m2    Calcium 10.0 8.5 - 10.1 MG/DL    Bilirubin, total 0.9 0.2 - 1.0 MG/DL    ALT (SGPT) 22 12 - 78 U/L    AST (SGOT) 57 (H) 15 - 37 U/L    Alk.  phosphatase 72 45 - 117 U/L    Protein, total 7.4 6.4 - 8.2 g/dL    Albumin 4.0 3.5 - 5.0 g/dL    Globulin 3.4 2.0 - 4.0 g/dL    A-G Ratio 1.2 1.1 - 2.2     TSH 3RD GENERATION    Collection Time: 08/06/18  5:39 PM   Result Value Ref Range    TSH 0.75 0.36 - 3.74 uIU/mL   URINALYSIS W/MICROSCOPIC    Collection Time: 08/06/18  7:09 PM   Result Value Ref Range    Color YELLOW/STRAW      Appearance CLEAR CLEAR      Specific gravity 1.022 1.003 - 1.030      pH (UA) 6.5 5.0 - 8.0      Protein NEGATIVE  NEG mg/dL    Glucose NEGATIVE  NEG mg/dL    Ketone TRACE (A) NEG mg/dL    Bilirubin NEGATIVE  NEG      Blood NEGATIVE  NEG      Urobilinogen 0.2 0.2 - 1.0 EU/dL    Nitrites NEGATIVE  NEG      Leukocyte Esterase NEGATIVE  NEG      WBC 0-4 0 - 4 /hpf    RBC 0-5 0 - 5 /hpf    Epithelial cells FEW FEW /lpf    Bacteria NEGATIVE  NEG /hpf    Hyaline cast 0-2 0 - 5 /lpf   URINE CULTURE HOLD SAMPLE    Collection Time: 08/06/18  7:09 PM   Result Value Ref Range    Urine culture hold        URINE ON HOLD IN MICROBIOLOGY DEPT FOR 3 DAYS. IF UNPRESERVED URINE IS SUBMITTED, IT CANNOT BE USED FOR ADDITIONAL TESTING AFTER 24 HRS, RECOLLECTION WILL BE REQUIRED. Recent Labs      08/06/18   1738   WBC  11.4*   HGB  15.7   HCT  48.5*   PLT  220     Recent Labs      08/06/18   1738   NA  141   K  4.0   CL  104   CO2  30   BUN  30*   CREA  0.81   GLU  118*   CA  10.0     Recent Labs      08/06/18   1738   SGOT  57*   ALT  22   AP  72   TBILI  0.9   TP  7.4   ALB  4.0   GLOB  3.4       No results for input(s): CPK, CKNDX, TROIQ in the last 72 hours. No lab exists for component: CPKMB    No results for input(s): INR, PTP, APTT in the last 72 hours. No lab exists for component: INREXT     No results for input(s): PH, PCO2, PO2 in the last 72 hours. Xr Chest Port    Result Date: 8/6/2018  IMPRESSION: No evidence of acute cardiopulmonary process. Ct Code Neuro Head Wo Contrast    Result Date: 8/6/2018  IMPRESSION: No acute intracranial abnormality. Assessment & Plan:     Mutism: this is not aphasia. The patient is not making an effort to speak. I suspect this is psychogenic. However, will get an MRI of the brain to r/o a stroke. Post-ictal state also on ddx however also unlikely. Will monitor on neuro floor for any recurrence of \"shaking\"/convulsions. I think the patient is depressed. Will consult psychiatry. Continue ASA and statin. Severe protein-calorie malnutrition: based on poor PO intake and marked weight loss.   -consult nutrition    DVT ppx: sq Lovenox  Code status: full  Disposition: TBD    Signed By: Antonio Atkins MD     August 6, 2018

## 2018-08-07 ENCOUNTER — APPOINTMENT (OUTPATIENT)
Dept: MRI IMAGING | Age: 78
DRG: 880 | End: 2018-08-07
Attending: HOSPITALIST
Payer: MEDICARE

## 2018-08-07 ENCOUNTER — TELEPHONE (OUTPATIENT)
Dept: CASE MANAGEMENT | Age: 78
End: 2018-08-07

## 2018-08-07 LAB
ANION GAP SERPL CALC-SCNC: 8 MMOL/L (ref 5–15)
BUN SERPL-MCNC: 30 MG/DL (ref 6–20)
BUN/CREAT SERPL: 42 (ref 12–20)
CALCIUM SERPL-MCNC: 9.2 MG/DL (ref 8.5–10.1)
CHLORIDE SERPL-SCNC: 107 MMOL/L (ref 97–108)
CO2 SERPL-SCNC: 28 MMOL/L (ref 21–32)
CREAT SERPL-MCNC: 0.72 MG/DL (ref 0.55–1.02)
GLUCOSE BLD STRIP.AUTO-MCNC: 101 MG/DL (ref 65–100)
GLUCOSE BLD STRIP.AUTO-MCNC: 113 MG/DL (ref 65–100)
GLUCOSE BLD STRIP.AUTO-MCNC: 113 MG/DL (ref 65–100)
GLUCOSE BLD STRIP.AUTO-MCNC: 72 MG/DL (ref 65–100)
GLUCOSE BLD STRIP.AUTO-MCNC: 83 MG/DL (ref 65–100)
GLUCOSE SERPL-MCNC: 89 MG/DL (ref 65–100)
MAGNESIUM SERPL-MCNC: 2.1 MG/DL (ref 1.6–2.4)
PHOSPHATE SERPL-MCNC: 4 MG/DL (ref 2.6–4.7)
POTASSIUM SERPL-SCNC: 3.6 MMOL/L (ref 3.5–5.1)
SERVICE CMNT-IMP: ABNORMAL
SERVICE CMNT-IMP: NORMAL
SERVICE CMNT-IMP: NORMAL
SODIUM SERPL-SCNC: 143 MMOL/L (ref 136–145)

## 2018-08-07 PROCEDURE — 74011250637 HC RX REV CODE- 250/637: Performed by: HOSPITALIST

## 2018-08-07 PROCEDURE — 82962 GLUCOSE BLOOD TEST: CPT

## 2018-08-07 PROCEDURE — 36415 COLL VENOUS BLD VENIPUNCTURE: CPT | Performed by: HOSPITALIST

## 2018-08-07 PROCEDURE — 84100 ASSAY OF PHOSPHORUS: CPT | Performed by: HOSPITALIST

## 2018-08-07 PROCEDURE — 99218 HC RM OBSERVATION: CPT

## 2018-08-07 PROCEDURE — 97530 THERAPEUTIC ACTIVITIES: CPT

## 2018-08-07 PROCEDURE — 97165 OT EVAL LOW COMPLEX 30 MIN: CPT

## 2018-08-07 PROCEDURE — 80048 BASIC METABOLIC PNL TOTAL CA: CPT | Performed by: HOSPITALIST

## 2018-08-07 PROCEDURE — 74011250636 HC RX REV CODE- 250/636: Performed by: HOSPITALIST

## 2018-08-07 PROCEDURE — 70551 MRI BRAIN STEM W/O DYE: CPT

## 2018-08-07 PROCEDURE — G8978 MOBILITY CURRENT STATUS: HCPCS

## 2018-08-07 PROCEDURE — 94760 N-INVAS EAR/PLS OXIMETRY 1: CPT

## 2018-08-07 PROCEDURE — 83735 ASSAY OF MAGNESIUM: CPT | Performed by: HOSPITALIST

## 2018-08-07 PROCEDURE — G8988 SELF CARE GOAL STATUS: HCPCS

## 2018-08-07 PROCEDURE — G8987 SELF CARE CURRENT STATUS: HCPCS

## 2018-08-07 PROCEDURE — G8979 MOBILITY GOAL STATUS: HCPCS

## 2018-08-07 PROCEDURE — 97161 PT EVAL LOW COMPLEX 20 MIN: CPT

## 2018-08-07 RX ORDER — PAROXETINE 10 MG/5ML
5 SUSPENSION ORAL DAILY
Status: DISCONTINUED | OUTPATIENT
Start: 2018-08-08 | End: 2018-08-09 | Stop reason: HOSPADM

## 2018-08-07 RX ORDER — LORAZEPAM 2 MG/ML
0.5 INJECTION INTRAMUSCULAR ONCE
Status: COMPLETED | OUTPATIENT
Start: 2018-08-07 | End: 2018-08-07

## 2018-08-07 RX ADMIN — MIRTAZAPINE 15 MG: 15 TABLET, FILM COATED ORAL at 21:01

## 2018-08-07 RX ADMIN — Medication 10 ML: at 14:12

## 2018-08-07 RX ADMIN — LORAZEPAM 0.5 MG: 2 INJECTION INTRAMUSCULAR; INTRAVENOUS at 14:12

## 2018-08-07 RX ADMIN — PANTOPRAZOLE SODIUM 40 MG: 40 TABLET, DELAYED RELEASE ORAL at 08:59

## 2018-08-07 RX ADMIN — ASPIRIN 81 MG CHEWABLE TABLET 81 MG: 81 TABLET CHEWABLE at 08:58

## 2018-08-07 RX ADMIN — LACTULOSE 10 G: 20 SOLUTION ORAL at 09:00

## 2018-08-07 RX ADMIN — PANTOPRAZOLE SODIUM 40 MG: 40 TABLET, DELAYED RELEASE ORAL at 17:57

## 2018-08-07 RX ADMIN — ATORVASTATIN CALCIUM 80 MG: 40 TABLET, FILM COATED ORAL at 08:59

## 2018-08-07 RX ADMIN — Medication 10 ML: at 21:01

## 2018-08-07 RX ADMIN — ENOXAPARIN SODIUM 40 MG: 100 INJECTION SUBCUTANEOUS at 21:01

## 2018-08-07 RX ADMIN — CLONAZEPAM 0.5 MG: 1 TABLET ORAL at 21:02

## 2018-08-07 NOTE — PROGRESS NOTES
Problem: Self Care Deficits Care Plan (Adult)  Goal: *Acute Goals and Plan of Care (Insert Text)  Occupational Therapy Goals  Initiated 8/7/2018    1. Patient will perform functional mobility to bathroom using least restrictive device with Bashir within 7 days. 2.  Patient will perform toilet transfer with Bashir within 7 days. 3.  Patient will perform all aspects of toileting with Bashir within 7 days. 4.  Patient will perform bathing seated with Bashir and additional time within 7 days. 5.  Patient will participate in upper extremity therapeutic exercise/activities with modified independence for 10 minutes within 7 day(s). 6.  Patient will utilize energy conservation techniques during functional activities with verbal cues within 7 day(s). Occupational Therapy EVALUATION  Patient: Jannetta Carrel (30 y.o. female)  Date: 8/7/2018  Primary Diagnosis: Aphasia        Precautions:   Fall, Seizure    ASSESSMENT :  Based on the objective data described below, the patient presents with anxiety, generally decreased functional strength, AROM of BUEs, intermittent tremors/shaking, c/o \"aching and numbness all over\", impaired standing balance/tolerance, impaired activity tolerance, following admission for aphasia and tremors. Pt alert, oriented x4 and followed 100% commands. ADls overall supervision/set-up to totalA, minAx2 for bed mobility, min-modAx2 for sit<>stand and transfer to bedside chair. Noted pt with ER admission on 7/27/18 where PT senior services evaluated pt (pt ambulated 400 feet and complete toileting with supervision). Pt reports inability to get OOB, urinating on self in bed (brief) and requiring paramedics for transfers to MD visits over past few weeks. feel pt is below safe and functional baseline to return home and recommend skilled nursing rehab at discharge.  In addition, pt  at bedside and reports he recently had extensive abdominal surgery and only able to lift 10 pounds, so unable to assists pt with recent fall from bed>BSC. Pt left in chair, chair alarm on, call bell in reach, eating lunch with set-up. Noted psych consult for today - pt with no aphasia during session. Recommend with nursing patient to complete as able in order to maintain strength, endurance and independence: ADLs with supervision/setup, OOB to chair 3x/day and mobilizing to the bathroom for toileting with 1-2 assist. Thank you for your assistance. Patient will benefit from skilled intervention to address the above impairments. Patients rehabilitation potential is considered to be Good  Factors which may influence rehabilitation potential include:   []             None noted  []             Mental ability/status  [x]             Medical condition  []             Home/family situation and support systems  []             Safety awareness  []             Pain tolerance/management  []             Other:      PLAN :  Recommendations and Planned Interventions:  [x]               Self Care Training                  [x]        Therapeutic Activities  [x]               Functional Mobility Training    []        Cognitive Retraining  [x]               Therapeutic Exercises           [x]        Endurance Activities  [x]               Balance Training                   []        Neuromuscular Re-Education  []               Visual/Perceptual Training     [x]   Home Safety Training  [x]               Patient Education                 [x]        Family Training/Education  []               Other (comment):    Frequency/Duration: Patient will be followed by occupational therapy 5 times a week to address goals. Discharge Recommendations: Skilled Nursing Facility  Further Equipment Recommendations for Discharge: TBD at rehab     SUBJECTIVE:   Patient stated I haven't been able to have a bowel movement in months and I can't eat.     OBJECTIVE DATA SUMMARY:   HISTORY:   Past Medical History:   Diagnosis Date    Acid reflux     Arthritis  Constipation     Diarrhea     Elevated cholesterol      Past Surgical History:   Procedure Laterality Date    HX HYSTERECTOMY         Prior Level of Function/Environment/Context: Pt lives with  (whom recently had abdominal surgery with 10 lb lifting restriction), per pt and , pt has been bed bound or transferring to [facility] only for weeks. Recent fall with bed to [facility] transfer; paramedics arrived to take pt to ER. Occupations in which the patient is/was successful, what are the barriers preventing that success:   Performance Patterns (routines, roles, habits, and rituals):   Personal Interests and/or values:   Expanded or extensive additional review of patient history:     Home Situation  Home Environment: Private residence  # Steps to Enter: 2  Rails to Enter: Yes  Hand Rails : Right  One/Two Story Residence: Two story, live on 1st floor  Living Alone: No  Support Systems: Spouse/Significant Other/Partner  Patient Expects to be Discharged to[de-identified] Private residence  Current DME Used/Available at Home: None  Tub or Shower Type: Tub/Shower combination    Hand dominance: Right    EXAMINATION OF PERFORMANCE DEFICITS:  Cognitive/Behavioral Status:  Neurologic State: Alert; Appropriate for age  Orientation Level: Oriented X4  Cognition: Follows commands; Appropriate safety awareness; Appropriate for age attention/concentration; Appropriate decision making  Perception: Appears intact  Perseveration: No perseveration noted  Safety/Judgement: Awareness of environment    Skin: please see nursing notes    Edema: none noted in BUEs    Hearing: Auditory  Auditory Impairment: None    Vision/Perceptual:    Tracking: Able to track stimulus in all quadrants w/o difficulty                 Diplopia: No    Acuity: Able to read clock/calendar on wall without difficulty; Able to read employee name badge without difficulty         Range of Motion:    AROM: Generally decreased, functional  PROM: Generally decreased, functional Strength:    Strength: Generally decreased, functional                Coordination:  Coordination: Generally decreased, functional  Fine Motor Skills-Upper: Left Intact; Right Intact    Gross Motor Skills-Upper: Left Impaired;Right Impaired    Tone & Sensation:    Tone: Normal  Sensation: Impaired (c/o \"numbness\" all over)                      Balance:  Sitting: Impaired; Without support  Sitting - Static: Good (unsupported)  Sitting - Dynamic: Fair (occasional)  Standing: Impaired; With support  Standing - Static: Fair;Constant support  Standing - Dynamic : Poor    Functional Mobility and Transfers for ADLs:  Bed Mobility:  Supine to Sit: Moderate assistance;Assist x2  Scooting: Moderate assistance    Transfers:  Sit to Stand: Moderate assistance;Assist x2  Stand to Sit: Minimum assistance;Assist x2  Bed to Chair: Moderate assistance;Assist x1  Toilet Transfer : Moderate assistance (inferred for [facility])    ADL Assessment:  Feeding: Supervision;Setup    Oral Facial Hygiene/Grooming: Supervision;Setup    Bathing: Maximum assistance    Upper Body Dressing: Moderate assistance    Lower Body Dressing: Maximum assistance    Toileting: Maximum assistance                ADL Intervention and task modifications:                                     Cognitive Retraining  Safety/Judgement: Awareness of environment     Functional Measure:  Barthel Index:    Bathin  Bladder: 5  Bowels: 5  Groomin  Dressin  Feeding: 10  Mobility: 0  Stairs: 0  Toilet Use: 5  Transfer (Bed to Chair and Back): 10  Total: 40       Barthel and G-code impairment scale:  Percentage of impairment CH  0% CI  1-19% CJ  20-39% CK  40-59% CL  60-79% CM  80-99% CN  100%   Barthel Score 0-100 100 99-80 79-60 59-40 20-39 1-19   0   Barthel Score 0-20 20 17-19 13-16 9-12 5-8 1-4 0      The Barthel ADL Index: Guidelines  1. The index should be used as a record of what a patient does, not as a record of what a patient could do.   2. The main aim is to establish degree of independence from any help, physical or verbal, however minor and for whatever reason. 3. The need for supervision renders the patient not independent. 4. A patient's performance should be established using the best available evidence. Asking the patient, friends/relatives and nurses are the usual sources, but direct observation and common sense are also important. However direct testing is not needed. 5. Usually the patient's performance over the preceding 24-48 hours is important, but occasionally longer periods will be relevant. 6. Middle categories imply that the patient supplies over 50 per cent of the effort. 7. Use of aids to be independent is allowed. Lucero Bower., Barthel, DSantaW. (1740). Functional evaluation: the Barthel Index. [address] [facility] (14)2. Guerita Mejía russell SOLOMON Arteaga, Malissa Romero, [place]., [place], [address] [facility] (1999). Measuring the change indisability after inpatient rehabilitation; comparison of the responsiveness of the Barthel Index and Functional [place] Measure. Journal of Neurology, Neurosurgery, and Psychiatry, 66(4), 486-685. Laly Osborne, N.J.A, MILAGROS Hdz, & Aletha Austin, MSantaA. (2004.) Assessment of post-stroke quality of life in cost-effectiveness studies: The usefulness of the Barthel Index and the EuroQoL-5D. Quality of Life Research, 13, 263-35         G codes: In compliance with CMSs Claims Based Outcome Reporting, the following G-code set was chosen for this patient based on their primary functional limitation being treated: The outcome measure chosen to determine the severity of the functional limitation was the Barthel Index with a score of 40/100 which was correlated with the impairment scale. ?  Self Care:     - CURRENT STATUS: CK - 40%-59% impaired, limited or restricted    - GOAL STATUS: CJ - 20%-39% impaired, limited or restricted    - D/C STATUS:  ---------------To be determined--------------- Occupational Therapy Evaluation Charge Determination   History Examination Decision-Making   MEDIUM Complexity : Expanded review of history including physical, cognitive and psychosocial  history  MEDIUM Complexity : 3-5 performance deficits relating to physical, cognitive , or psychosocial skils that result in activity limitations and / or participation restrictions MEDIUM Complexity : Patient may present with comorbidities that affect occupational performnce. Miniml to moderate modification of tasks or assistance (eg, physical or verbal ) with assesment(s) is necessary to enable patient to complete evaluation       Based on the above components, the patient evaluation is determined to be of the following complexity level: MEDIUM     Activity Tolerance:   VSS  Please refer to the flowsheet for vital signs taken during this treatment. After treatment:   [x] Patient left in no apparent distress sitting up in chair  [] Patient left in no apparent distress in bed  [x] Call bell left within reach  [x] Nursing notified  [x] Caregiver present  [x] Bed alarm activated    COMMUNICATION/EDUCATION:   The patients plan of care was discussed with: Physical Therapist and Registered Nurse. [x] Home safety education was provided and the patient/caregiver indicated understanding. [x] Patient/family have participated as able in goal setting and plan of care. [x] Patient/family agree to work toward stated goals and plan of care. [] Patient understands intent and goals of therapy, but is neutral about his/her participation. [] Patient is unable to participate in goal setting and plan of care. This patients plan of care is appropriate for delegation to [facility].     Thank you for this referral.  Valentino Cassidy OT  Time Calculation: 23 mins

## 2018-08-07 NOTE — ROUTINE PROCESS
IDR Summary          Patient: Rita Moura MRN: [id number]    Age: 69 y.o. YOB: 1940 Room/Bed: [facility]   Admit Diagnosis: Aphasia  Principal Diagnosis: Aphasia   [place]: Attending [address]  PCP: Tati Chow MD    Readmission: NO          Testing due for pt today?  YES    Discharge plan for the day:  MRI, Psych consult       Discharge plan for the stay: r/o stroke    Discharge plan for the way:  to transport    Signed:     Kristan Olsen  8/7/2018  9:47 AM

## 2018-08-07 NOTE — PROGRESS NOTES
notes that patient had a scheduled MRI and I was unable to evaluate and  Radha Anaya was not on the unit.

## 2018-08-07 NOTE — PROGRESS NOTES
NUTRITION COMPLETE ASSESSMENT    RECOMMENDATIONS:   1. Encourage PO intake with meals and supplements   - document % meals consumed in I/O flowsheet   - should be drinking Ensure Enlive if less than 50% meals consumed    2. Pt at risk for refeeding syndrome (even with oral intake):   - check Mg, Phos, K+ and replete PRN   - add 100mg thiamine/day x 10 days     3. Adjust bowel regimen PRN pending BM  4. Consider appetite stimulant if intake remains low (megace vs marinol)     Interventions/Plan:   Food/Nutrient Delivery:   (preferences noted, diet liberalized to regular) Commercial supplement (Ensure Enlive TID)    (appetite stimulant if remeron not effective)      Assessment:   Reason for Assessment: [x] Provider Consult [x]BPA/MST Referral     Diet: Cardiac, Regular  Supplements: none  Nutritionally Significant Medications: [x] Reviewed & Includes: SSI, lactulose, ativan, remeron, protonix, NS @ 75ml/hr; PRN: zofran  Meal Intake:   Patient Vitals for the past 100 hrs:   % Diet Eaten   08/07/18 1200 100 %   08/07/18 0800 50 %     Pre-Hospitalization:  Usual Appetite: Poor  Diet at Home: regular  Vitamins/Supplements: Yes (Ensure 1-2x/day)    Subjective:  Pt off the floor for MRI, spoke with , see below. Objective:  Pt admitted for aphasia. PMHx: HLD, GERD. MD noting \"Mutism\" not aphasia since no attempts to speak. Psych consulted for possible depression - pending. Remeron rx. Last BM 1 week ago per  with lactulose ordered. Severe protein-calorie malnutrition with severe wt loss of 42# x 3-4 months noted with decreased appetite and fluid intake.    Wt Readings from Last 10 Encounters:   08/07/18 48.1 kg (106 lb 0.7 oz)   08/07/18 48.1 kg (106 lb 0.7 oz)   07/27/18 45.6 kg (100 lb 9.6 oz)   06/05/18 52.3 kg (115 lb 3.2 oz)     Patient meets criteria for Severe Acute Protein Calorie Malnutrition as evidenced by:   ASPEN Malnutrition Criteria  Acute Illness, Chronic Illness, or Social/Enviornmental: Acute illness  Energy Intake: Less than/equal to 50% est energy req for greater than/equal to 5 days  Weight Loss: Greater than 7.5% x 3 mos  ASPEN Malnutrition Score - Acute Illness: 12  Acute Illness - Malnutrition Diagnosis: Severe malnutrition. Pt at high risk for refeeding syndrome. Recommend adding 100mg thiamine/day for 10 days. Will need to closely monitor electrolytes with repletion PRN.  reports poor intake for some time now. Seen by psychiatrist outpatient but has not been taking medications as ordered. Previously drinking Ensure TID but has reduced to just 1x/day recently with just some cereal at breakfast.  notes pt refusing meals and not interested in listening to recommendations from others. Will add Ensure Enlive TID (1050kcal, 60g protein). Will continue to follow for PO intake, supplement acceptance, electrolytes, BM, and wt trends. Estimated Nutrition Needs:   Kcals/day: 1200 Kcals/day (1200-1344kcal)  Protein: 48 g (48-58g (1-1.2g/kg))  Fluid: 1200 ml (1ml/kcal)  Based On: Kcal/kg - specify (Comment) (25-28kcal/kg)  Weight Used: Actual wt (48.1kg)    Pt expected to meet estimated nutrient needs:  []   Yes     []  No [x] Unable to predict at this time  Nutrition Diagnosis:   1. Malnutrition related to inadequate oral intake ?  2/2 depression as evidenced by severe wt loss of  27% x 4 months (per ); less than 50% meals     Goals:     Consumption of at least 50% of meals and 1-2 supplements/day in 5-7 days; wt maintenance     Monitoring & Evaluation:    - Liquid meal replacement, Total energy intake, Protein intake   - Weight/weight change, GI, Electrolyte and renal profile    Previous Nutrition Goals Met:   N/A  Previous Recommendations:    N/A    Education & Discharge Needs:   [x] None Identified   [] Identified and addressed    [] Participated in care plan, discharge planning, and/or interdisciplinary rounds        Cultural, Mosque and ethnic food preferences identified: None    Skin Integrity: [x]Intact  []Other  Edema: [x]None []Other  Last BM: PTA   Food Allergies: [x]None []Other  Diet Restrictions: Cultural/Lutheran Preference(s): None     Anthropometrics:    Weight Loss Metrics 8/7/2018 7/27/2018 6/5/2018   Today's Wt 106 lb 0.7 oz 100 lb 9.6 oz 115 lb 3.2 oz   BMI 20.71 kg/m2 19.65 kg/m2 22.5 kg/m2      Weight Source: Bed  Height: 5' (152.4 cm),    Body mass index is 20.71 kg/(m^2).   IBW : 45.4 kg (100 lb), % IBW (Calculated): 106.04 %  Usual Body Weight: 65.8 kg (145 lb) (per  reports),      Labs:    Lab Results   Component Value Date/Time    Sodium 143 08/07/2018 03:07 AM    Potassium 3.6 08/07/2018 03:07 AM    Chloride 107 08/07/2018 03:07 AM    CO2 28 08/07/2018 03:07 AM    Glucose 89 08/07/2018 03:07 AM    BUN 30 (H) 08/07/2018 03:07 AM    Creatinine 0.72 08/07/2018 03:07 AM    Calcium 9.2 08/07/2018 03:07 AM    Magnesium 2.1 08/07/2018 03:07 AM    Phosphorus 4.0 08/07/2018 03:07 AM    Albumin 4.0 08/06/2018 05:38 PM     Donna Wang RD  Pager #[phone]

## 2018-08-07 NOTE — PROGRESS NOTES
Problem: Mobility Impaired (Adult and Pediatric)  Goal: *Acute Goals and Plan of Care (Insert Text)  Physical Therapy Goals  Initiated 8/7/2018  1. Patient will move from supine to sit and sit to supine  and scoot up and down in bed with minimal assistance/contact guard assist within 7 day(s). 2.  Patient will transfer from bed to chair and chair to bed with minimal assistance/contact guard assist using the least restrictive device within 7 day(s). 3.  Patient will perform sit to stand with minimal assistance/contact guard assist within 7 day(s). 4.  Patient will ambulate with minimal assistance/contact guard assist for 50 feet with the least restrictive device within 7 day(s). 5.  Patient will ascend/descend 2 stairs with handrail(s) with minimal assistance/contact guard assist within 7 day(s). 6.  Patient will improve Candelario Balance score by 7 points within 7 days. physical Therapy EVALUATION- neuro population    Patient: Paalk Laird (69 y.o. female)  Date: 8/7/2018  Primary Diagnosis: Aphasia        Precautions:   Fall, Seizure    ASSESSMENT :  Based on the objective data described below, the patient presents with impaired functional mobility as compared to baseline level when well 2* globally decreased functional strength, anxiety/tremors with mobility, impaired balance, and impaired gait following admission for \"mutisim\" and tremors. CT negative for acute process, MRI pending. Prior to admission, pt reports that she lived at home with her spouse and has essentially been bed bound for the past month 2* increasing weakness. Hx recent fall. Prior to this, she endorses being fully ambulatory without AD. Today, she required up to mod A x2 with marked increased time to mobilize to EOB and achieve B foot approximation to floor. Demos good/fair sitting balance with occasional posterior lean during dynamic task completion.  Performed sit<>stand from EOB with mod A x2, cues for hand placement sequencing for safety. With cues and increased time, able to achieve full upright stance and initiate lateral weight shifting without evidence of knee buckle. Able to take several small, shuffled steps to chair with B UE support on therapist and mod A x1 +min A x1 for safety, near constant assist needed to maintain fwd weight shift of COG over CHERRY. Likely would benefit from use of RW next session. Pt remained up in chair at end of session, NAD. At this time, feel she is far below her baseline level and would greatly benefit from discharge to SNF level rehab once medically cleared. Will follow. Recommend with nursing patient to complete as able in order to maintain strength, endurance and independence: OOB to chair and BSC with assist x2 and for safety. Thank you for your assistance. Patient will benefit from skilled intervention to address the above impairments. Patients rehabilitation potential is considered to be Good  Factors which may influence rehabilitation potential include:   [x]           None noted  []           Mental ability/status  []           Medical condition  []           Home/family situation and support systems  []           Safety awareness  []           Pain tolerance/management  []           Other:      PLAN :  Recommendations and Planned Interventions:  [x]             Bed Mobility Training             [x]      Neuromuscular Re-Education  [x]             Transfer Training                   []      Orthotic/Prosthetic Training  [x]             Gait Training                         []      Modalities  [x]             Therapeutic Exercises           []      Edema Management/Control  [x]             Therapeutic Activities            [x]      Patient and Family Training/Education  []             Other (comment):  Frequency/Duration: Patient will be followed by physical therapy 5 times a week to address goals.   Discharge Recommendations: Mina Ruiz  Further Equipment Recommendations for Discharge: TBD     SUBJECTIVE:   Patient stated I get nervous.     OBJECTIVE DATA SUMMARY:   HISTORY:    Past Medical History:   Diagnosis Date    Acid reflux     Arthritis     Constipation     Diarrhea     Elevated cholesterol      Past Surgical History:   Procedure Laterality Date    HX HYSTERECTOMY       Prior Level of Function/Home Situation: lives at home with spouse who provides assist as needed; recently become bed bound 2* weakness; hx falls; fully indep prior to decline  Personal factors and/or comorbidities impacting plan of care:     Home Situation  Home Environment: Private residence  # Steps to Enter: 2  Rails to Enter: Yes  Hand Rails : Right  One/Two Story Residence: Two story, live on 1st floor  Living Alone: No  Support Systems: Spouse/Significant Other/Partner  Patient Expects to be Discharged to[de-identified] Rehabilitation facility  Current DME Used/Available at Home: None  Tub or Shower Type: Tub/Shower combination    EXAMINATION/PRESENTATION/DECISION MAKING:   Critical Behavior:  Neurologic State: Alert  Orientation Level: Oriented X4  Cognition: Follows commands  Safety/Judgement: Awareness of environment, Insight into deficits  Hearing: Auditory  Auditory Impairment: None  Skin:  Intact where exposed   Edema: none noted  Range Of Motion:  AROM: Generally decreased, functional     PROM: Generally decreased, functional     Strength:    Strength: Generally decreased, functional        Tone & Sensation:   Tone: Normal   Sensation: Impaired (c/o \"numbness\" all over)          Coordination:  Coordination: Generally decreased, functional  Vision:   Tracking: Able to track stimulus in all quadrants w/o difficulty  Diplopia: No  Acuity: Able to read clock/calendar on wall without difficulty; Able to read employee name badge without difficulty  Functional Mobility:  Bed Mobility:     Supine to Sit: Assist x2; Moderate assistance; Additional time  Scooting: Additional time; Moderate assistance  Transfers:  Sit to Stand: Assist x2; Moderate assistance  Stand to Sit: Assist x2;Minimum assistance  Bed to Chair: Moderate assistance     Balance:   Sitting: Impaired; With support  Sitting - Static: Good (unsupported)  Sitting - Dynamic: Fair (occasional)  Standing: Impaired; With support  Standing - Static: Fair;Constant support  Standing - Dynamic : Poor     Functional Measure  Candelario Balance Test:    Sitting to Standin  Standing Unsupported: 0  Sitting with Back Unsupported: 3  Standing to Sittin  Transfers: 1  Standing Unsupported with Eyes Closed: 0  Standing Unsupported with Feet Together: 0  Reach Forward with Outstretched Arm: 0   Object: 0  Turn to Look Over Shoulders: 0  Turn 360 Degrees: 0  Alternate Foot on Step/Stool: 0  Standing Unsupported One Foot in Front: 0  Stand on One Le  Total: 4         56=Maximum possible score;   0-20=High fall risk  21-40=Moderate fall risk   41-56=Low fall risk     Candelario Balance Test and G-code impairment scale:  Percentage of Impairment CH    0%   CI    1-19% CJ    20-39% CK    40-59% CL    60-79% CM    80-99% CN     100%   Candelario   Score 0-56 56 45-55 34-44 23-33 12-22 1-11 0       G codes: In compliance with CMSs Claims Based Outcome Reporting, the following G-code set was chosen for this patient based on their primary functional limitation being treated: The outcome measure chosen to determine the severity of the functional limitation was the Jossue Helmsy with a score of 4/56 which was correlated with the impairment scale.     ? Mobility - Walking and Moving Around:     - CURRENT STATUS: CM - 80%-99% impaired, limited or restricted    - GOAL STATUS: CL - 60%-79% impaired, limited or restricted    - D/C STATUS:  ---------------To be determined---------------     Physical Therapy Evaluation Charge Determination   History Examination Presentation Decision-Making   HIGH Complexity :3+ comorbidities / personal factors will impact the outcome/ POC  MEDIUM Complexity : 3 Standardized tests and measures addressing body structure, function, activity limitation and / or participation in recreation  LOW Complexity : Stable, uncomplicated  Other outcome measures saenz  HIGH       Based on the above components, the patient evaluation is determined to be of the following complexity level: LOW     Therapeutic Exercises:     Pain:  Pain Scale 1: Numeric (0 - 10)  Pain Intensity 1: 0   Activity Tolerance:     Please refer to the flowsheet for vital signs taken during this treatment. After treatment:   [x]     Patient left in no apparent distress sitting up in chair  []     Patient left in no apparent distress in bed  [x]     Call bell left within reach  [x]     Nursing notified  [x]     Caregiver present  [x]     Chair alarm activated    COMMUNICATION/EDUCATION:   The patients plan of care was discussed with: Registered Nurse. [x]  Fall prevention education was provided and the patient/caregiver indicated understanding. [x]  Patient/family have participated as able in goal setting and plan of care. [x]  Patient/family agree to work toward stated goals and plan of care. []  Patient understands intent and goals of therapy, but is neutral about his/her participation. []  Patient is unable to participate in goal setting and plan of care.     Thank you for this referral.  Kevon Gongora, PT, DPT   Time Calculation: 24 mins

## 2018-08-07 NOTE — PROGRESS NOTES
TRANSFER - IN REPORT:    Verbal report received from Martin Lawson (name) on Tamara Berg  being received from ER(unit) for routine progression of care      Report consisted of patients Situation, Background, Assessment and   Recommendations(SBAR). Information from the following report(s) SBAR, Kardex, Procedure Summary, Intake/Output, MAR, Accordion, Recent Results, Med Rec Status and Cardiac Rhythm SR was reviewed with the receiving nurse. Opportunity for questions and clarification was provided. Assessment completed upon patients arrival to unit and care assumed.

## 2018-08-07 NOTE — PROGRESS NOTES
Problem: Falls - Risk of  Goal: *Absence of Falls  Document Derian Fall Risk and appropriate interventions in the flowsheet.    Outcome: Progressing Towards Goal  Fall Risk Interventions:  Mobility Interventions: Assess mobility with egress test, Bed/chair exit alarm, Communicate number of staff needed for ambulation/transfer, OT consult for ADLs, Patient to call before getting OOB, PT Consult for mobility concerns, PT Consult for assist device competence, Utilize walker, cane, or other assistive device    Mentation Interventions: Adequate sleep, hydration, pain control, Bed/chair exit alarm, Door open when patient unattended, Increase mobility, More frequent rounding, Reorient patient, Room close to nurse's station, Toileting rounds, Update white board    Medication Interventions: Assess postural VS orthostatic hypotension, Patient to call before getting OOB, Teach patient to arise slowly, Bed/chair exit alarm    Elimination Interventions: Bed/chair exit alarm, Call light in reach, Patient to call for help with toileting needs, Toileting schedule/hourly rounds    History of Falls Interventions: Bed/chair exit alarm, Consult care management for discharge planning, Door open when patient unattended, Investigate reason for fall, Room close to nurse's station

## 2018-08-07 NOTE — PROGRESS NOTES
Problem: Falls - Risk of  Goal: *Absence of Falls  Document Derian Fall Risk and appropriate interventions in the flowsheet. Outcome: Progressing Towards Goal  Fall Risk Interventions:  Mobility Interventions: Bed/chair exit alarm, Patient to call before getting OOB, Communicate number of staff needed for ambulation/transfer    Mentation Interventions: Adequate sleep, hydration, pain control, Bed/chair exit alarm, Door open when patient unattended, More frequent rounding, Reorient patient    Medication Interventions: Bed/chair exit alarm, Patient to call before getting OOB, Teach patient to arise slowly    Elimination Interventions: Bed/chair exit alarm, Call light in reach, Toileting schedule/hourly rounds    History of Falls Interventions: Bed/chair exit alarm, Door open when patient unattended, Investigate reason for fall, Room close to nurse's station        Problem: Pressure Injury - Risk of  Goal: *Prevention of pressure injury  Document James Scale and appropriate interventions in the flowsheet.    Outcome: Progressing Towards Goal  Pressure Injury Interventions:       Moisture Interventions: Absorbent underpads, Assess need for specialty bed, Maintain skin hydration (lotion/cream), Minimize layers    Activity Interventions: Increase time out of bed, PT/OT evaluation, Pressure redistribution bed/mattress(bed type)    Mobility Interventions: HOB 30 degrees or less, PT/OT evaluation, Pressure redistribution bed/mattress (bed type)    Nutrition Interventions: Document food/fluid/supplement intake    Friction and Shear Interventions: Lift sheet, HOB 30 degrees or less

## 2018-08-07 NOTE — TELEPHONE ENCOUNTER
Return call received from GONZALEZ Matthews at [address] [facility]  informed patient is currently hospitalized at [facility]. CM Department will follow for transitional care needs.

## 2018-08-07 NOTE — PROGRESS NOTES
Hospitalist Progress Note  Serg Woodard MD  Answering service: [phone] OR 36 from in house phone        Date of Service:  2018  NAME:  Chaim Rock  :  1940  MRN:  [id number]      Admission Summary:   69 y.o lady with hyperlipidemia who presented after an episode of generalized shaking. She is currently not speaking at all and doesn't provide any history. Her  says that earlier today she had shaking of her arms and legs, she didn't lose consciousness, he is unsure how long it lasted for. He states that after the shaking subsided, she stopped talking. She hasn't had any acute complaints recently. However, she's apparently had a 40 lb weight loss over the past 4 months. Her  also endorses that she has been under a lot of stress due to finances and other personal issues.       Interval history / Subjective:     No further episodes of speech impairment     Assessment & Plan:     Mutism POA The patient was not making an effort to speak. Resolved since admission. MRI brain pending. Psych to follow. Continue ASA and statin.     Severe protein-calorie malnutrition: based on poor PO intake and marked weight loss. -consult nutrition  Code status: Full  DVT prophylaxis: lovenox    Care Plan discussed with: Patient/Family  Disposition: TBD     Hospital Problems  Never Reviewed          Codes Class Noted POA    * (Principal)Aphasia ICD-10-CM: R47.01  ICD-9-CM: 784.3  2018 Yes                Review of Systems:   Not required       Vital Signs:    Last 24hrs VS reviewed since prior progress note.  Most recent are:  Visit Vitals    /69 (BP 1 Location: Right arm, BP Patient Position: At rest)    Pulse 78    Temp 98.1 °F (36.7 °C)    Resp 18    Ht 5' (1.524 m)    Wt 48.1 kg (106 lb 0.7 oz)    SpO2 100%    BMI 20.71 kg/m2       No intake or output data in the 24 hours ending 18 1015     Physical Examination: Constitutional:  No acute distress, cooperative, pleasant    ENT:  Oral mucous moist, oropharynx benign. Neck supple,    Resp:  CTA bilaterally. No wheezing/rhonchi/rales. No accessory muscle use   CV:  Regular rhythm, normal rate, no murmurs, gallops, rubs    GI:  Soft, non distended, non tender. normoactive bowel sounds, no hepatosplenomegaly     Musculoskeletal:  No edema, warm, 2+ pulses throughout    Neurologic:  Moves all extremities, CN II-XII reviewed            Data Review:    Review and/or order of clinical lab test      Labs:     Recent Labs      08/06/18   1738   WBC  11.4*   HGB  15.7   HCT  48.5*   PLT  220     Recent Labs      08/07/18   0307  08/06/18   1738   NA  143  141   K  3.6  4.0   CL  107  104   CO2  28  30   BUN  30*  30*   CREA  0.72  0.81   GLU  89  118*   CA  9.2  10.0   MG  2.1   --    PHOS  4.0   --      Recent Labs      08/06/18   1738   SGOT  57*   ALT  22   AP  72   TBILI  0.9   TP  7.4   ALB  4.0   GLOB  3.4     No results for input(s): INR, PTP, APTT in the last 72 hours. No lab exists for component: INREXT   No results for input(s): FE, TIBC, PSAT, FERR in the last 72 hours. No results found for: FOL, RBCF   No results for input(s): PH, PCO2, PO2 in the last 72 hours. No results for input(s): CPK, CKNDX, TROIQ in the last 72 hours.     No lab exists for component: CPKMB  No results found for: CHOL, CHOLX, CHLST, CHOLV, HDL, LDL, LDLC, DLDLP, TGLX, TRIGL, TRIGP, CHHD, CHHDX  Lab Results   Component Value Date/Time    Glucose (POC) 83 08/07/2018 07:36 AM    Glucose (POC) 72 08/07/2018 07:26 AM    Glucose (POC) 102 (H) 08/06/2018 05:15 PM     Lab Results   Component Value Date/Time    Color YELLOW/STRAW 08/06/2018 07:09 PM    Appearance CLEAR 08/06/2018 07:09 PM    Specific gravity 1.022 08/06/2018 07:09 PM    pH (UA) 6.5 08/06/2018 07:09 PM    Protein NEGATIVE  08/06/2018 07:09 PM    Glucose NEGATIVE  08/06/2018 07:09 PM    Ketone TRACE (A) 08/06/2018 07:09 PM Bilirubin NEGATIVE  08/06/2018 07:09 PM    Urobilinogen 0.2 08/06/2018 07:09 PM    Nitrites NEGATIVE  08/06/2018 07:09 PM    Leukocyte Esterase NEGATIVE  08/06/2018 07:09 PM    Epithelial cells FEW 08/06/2018 07:09 PM    Bacteria NEGATIVE  08/06/2018 07:09 PM    WBC 0-4 08/06/2018 07:09 PM    RBC 0-5 08/06/2018 07:09 PM         Medications Reviewed:     Current Facility-Administered Medications   Medication Dose Route Frequency    aspirin chewable tablet 81 mg  81 mg Oral DAILY    atorvastatin (LIPITOR) tablet 80 mg  80 mg Oral DAILY    clonazePAM (KlonoPIN) tablet 0.5 mg  0.5 mg Oral QHS PRN    lactulose (CHRONULAC) solution 10 g  10 g Oral DAILY AFTER BREAKFAST    mirtazapine (REMERON) tablet 15 mg  15 mg Oral QHS    sodium chloride (NS) flush 5-10 mL  5-10 mL IntraVENous Q8H    sodium chloride (NS) flush 5-10 mL  5-10 mL IntraVENous PRN    enoxaparin (LOVENOX) injection 40 mg  40 mg SubCUTAneous Q24H    0.9% sodium chloride infusion  75 mL/hr IntraVENous CONTINUOUS    ibuprofen (MOTRIN) tablet 400 mg  400 mg Oral Q4H PRN    ondansetron (ZOFRAN) injection 4 mg  4 mg IntraVENous Q4H PRN    pantoprazole (PROTONIX) tablet 40 mg  40 mg Oral BID    insulin lispro (HUMALOG) injection   SubCUTAneous AC&HS    glucose chewable tablet 16 g  4 Tab Oral PRN    dextrose (D50W) injection syrg 12.5-25 g  12.5-25 g IntraVENous PRN    glucagon (GLUCAGEN) injection 1 mg  1 mg IntraMUSCular PRN     ______________________________________________________________________  EXPECTED LENGTH OF STAY: - - -  ACTUAL LENGTH OF STAY:          Jessee Lunsford MD

## 2018-08-08 LAB
ANION GAP SERPL CALC-SCNC: 11 MMOL/L (ref 5–15)
ATRIAL RATE: 80 BPM
BASOPHILS # BLD: 0 K/UL (ref 0–0.1)
BASOPHILS NFR BLD: 0 % (ref 0–1)
BUN SERPL-MCNC: 24 MG/DL (ref 6–20)
BUN/CREAT SERPL: 31 (ref 12–20)
CALCIUM SERPL-MCNC: 8.6 MG/DL (ref 8.5–10.1)
CALCULATED R AXIS, ECG10: 23 DEGREES
CALCULATED T AXIS, ECG11: 44 DEGREES
CHLORIDE SERPL-SCNC: 108 MMOL/L (ref 97–108)
CO2 SERPL-SCNC: 24 MMOL/L (ref 21–32)
CREAT SERPL-MCNC: 0.77 MG/DL (ref 0.55–1.02)
DIAGNOSIS, 93000: NORMAL
DIFFERENTIAL METHOD BLD: NORMAL
EOSINOPHIL # BLD: 0.2 K/UL (ref 0–0.4)
EOSINOPHIL NFR BLD: 2 % (ref 0–7)
ERYTHROCYTE [DISTWIDTH] IN BLOOD BY AUTOMATED COUNT: 12.5 % (ref 11.5–14.5)
GLUCOSE BLD STRIP.AUTO-MCNC: 94 MG/DL (ref 65–100)
GLUCOSE SERPL-MCNC: 100 MG/DL (ref 65–100)
HCT VFR BLD AUTO: 45.6 % (ref 35–47)
HGB BLD-MCNC: 14.5 G/DL (ref 11.5–16)
IMM GRANULOCYTES # BLD: 0 K/UL (ref 0–0.04)
IMM GRANULOCYTES NFR BLD AUTO: 0 % (ref 0–0.5)
LYMPHOCYTES # BLD: 2.5 K/UL (ref 0.8–3.5)
LYMPHOCYTES NFR BLD: 25 % (ref 12–49)
MAGNESIUM SERPL-MCNC: 1.9 MG/DL (ref 1.6–2.4)
MCH RBC QN AUTO: 30 PG (ref 26–34)
MCHC RBC AUTO-ENTMCNC: 31.8 G/DL (ref 30–36.5)
MCV RBC AUTO: 94.4 FL (ref 80–99)
MONOCYTES # BLD: 0.7 K/UL (ref 0–1)
MONOCYTES NFR BLD: 7 % (ref 5–13)
NEUTS SEG # BLD: 6.6 K/UL (ref 1.8–8)
NEUTS SEG NFR BLD: 66 % (ref 32–75)
NRBC # BLD: 0 K/UL (ref 0–0.01)
NRBC BLD-RTO: 0 PER 100 WBC
PLATELET # BLD AUTO: 182 K/UL (ref 150–400)
PMV BLD AUTO: 11.7 FL (ref 8.9–12.9)
POTASSIUM SERPL-SCNC: 4 MMOL/L (ref 3.5–5.1)
Q-T INTERVAL, ECG07: 296 MS
QRS DURATION, ECG06: 76 MS
QTC CALCULATION (BEZET), ECG08: 455 MS
RBC # BLD AUTO: 4.83 M/UL (ref 3.8–5.2)
SERVICE CMNT-IMP: NORMAL
SODIUM SERPL-SCNC: 143 MMOL/L (ref 136–145)
TROPONIN I SERPL-MCNC: <0.05 NG/ML
VENTRICULAR RATE, ECG03: 142 BPM
WBC # BLD AUTO: 10 K/UL (ref 3.6–11)

## 2018-08-08 PROCEDURE — 85025 COMPLETE CBC W/AUTO DIFF WBC: CPT | Performed by: NURSE PRACTITIONER

## 2018-08-08 PROCEDURE — 93306 TTE W/DOPPLER COMPLETE: CPT

## 2018-08-08 PROCEDURE — 74011000258 HC RX REV CODE- 258: Performed by: NURSE PRACTITIONER

## 2018-08-08 PROCEDURE — 99218 HC RM OBSERVATION: CPT

## 2018-08-08 PROCEDURE — 97116 GAIT TRAINING THERAPY: CPT

## 2018-08-08 PROCEDURE — 80048 BASIC METABOLIC PNL TOTAL CA: CPT | Performed by: NURSE PRACTITIONER

## 2018-08-08 PROCEDURE — 74011250636 HC RX REV CODE- 250/636: Performed by: NURSE PRACTITIONER

## 2018-08-08 PROCEDURE — 74011250636 HC RX REV CODE- 250/636: Performed by: HOSPITALIST

## 2018-08-08 PROCEDURE — 36415 COLL VENOUS BLD VENIPUNCTURE: CPT | Performed by: NURSE PRACTITIONER

## 2018-08-08 PROCEDURE — 74011250637 HC RX REV CODE- 250/637: Performed by: HOSPITALIST

## 2018-08-08 PROCEDURE — 82962 GLUCOSE BLOOD TEST: CPT

## 2018-08-08 PROCEDURE — 74011250637 HC RX REV CODE- 250/637: Performed by: PSYCHIATRY & NEUROLOGY

## 2018-08-08 PROCEDURE — 83735 ASSAY OF MAGNESIUM: CPT | Performed by: NURSE PRACTITIONER

## 2018-08-08 PROCEDURE — 65660000000 HC RM CCU STEPDOWN

## 2018-08-08 PROCEDURE — 93041 RHYTHM ECG TRACING: CPT

## 2018-08-08 PROCEDURE — 84484 ASSAY OF TROPONIN QUANT: CPT | Performed by: NURSE PRACTITIONER

## 2018-08-08 RX ADMIN — AMIODARONE HYDROCHLORIDE 1 MG/MIN: 50 INJECTION, SOLUTION INTRAVENOUS at 03:31

## 2018-08-08 RX ADMIN — SODIUM CHLORIDE 75 ML/HR: 900 INJECTION, SOLUTION INTRAVENOUS at 19:13

## 2018-08-08 RX ADMIN — SODIUM CHLORIDE 250 ML: 900 INJECTION, SOLUTION INTRAVENOUS at 03:22

## 2018-08-08 RX ADMIN — AMIODARONE HYDROCHLORIDE 150 MG: 50 INJECTION, SOLUTION INTRAVENOUS at 03:22

## 2018-08-08 RX ADMIN — Medication 10 ML: at 21:31

## 2018-08-08 RX ADMIN — PANTOPRAZOLE SODIUM 40 MG: 40 TABLET, DELAYED RELEASE ORAL at 18:00

## 2018-08-08 RX ADMIN — PAROXETINE HYDROCHLORIDE 5 MG: 10 SUSPENSION ORAL at 08:32

## 2018-08-08 RX ADMIN — ASPIRIN 81 MG CHEWABLE TABLET 81 MG: 81 TABLET CHEWABLE at 08:32

## 2018-08-08 RX ADMIN — SODIUM CHLORIDE 250 ML: 900 INJECTION, SOLUTION INTRAVENOUS at 04:00

## 2018-08-08 RX ADMIN — PANTOPRAZOLE SODIUM 40 MG: 40 TABLET, DELAYED RELEASE ORAL at 08:32

## 2018-08-08 RX ADMIN — ATORVASTATIN CALCIUM 80 MG: 40 TABLET, FILM COATED ORAL at 08:32

## 2018-08-08 RX ADMIN — ENOXAPARIN SODIUM 40 MG: 100 INJECTION SUBCUTANEOUS at 21:30

## 2018-08-08 RX ADMIN — CLONAZEPAM 0.5 MG: 1 TABLET ORAL at 21:30

## 2018-08-08 RX ADMIN — LACTULOSE 10 G: 20 SOLUTION ORAL at 08:33

## 2018-08-08 RX ADMIN — Medication 10 ML: at 06:00

## 2018-08-08 NOTE — CONSULTS
PSYCHIATRIC CONSULTATION                         IDENTIFICATION:    Patient Name  Francisca Jose   Date of Birth 1940   [facility] [id number]   Medical Record Number  [id number]      Age  69 y.o. PCP Leticia Diez MD   Admit date:  2018    Room Number  658/01  @ [facility]   Date of Service  2018            HISTORY         REASON FOR HOSPITALIZATION/CONSULTATION:  Asked to see re dx/tx  CC: \"40 lb. Weight loss and constipation lead me to not feeling well. \"    HISTORY OF PRESENT ILLNESS:    Pt admited for tremors and relatively brief time of feeling herself unable to speak though she could formulate thoughts. Dissipated shortly after coming to ED. 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 Pt with one prior episode of constipation after her mother  but resolved with metamucil when her home remedies, lots of prunes et al did not work. Per daughter many stressful issues at home with $, pt being a go-getter being unable to go, exacerbation of her tendency to be a worrier, upset at not being able to work anymore. Daughter notes pt to have been anxious, caretaker type and with decline in function has been more symptomatic in unfortunate dance. Pt seeing Dr. Radha Diaz, psychiatrist recently through health program and mirtazapine started but pt unhappy with ADRs and discontinued it. ALLERGIES:  Allergies   Allergen Reactions    Tylenol [Acetaminophen] Rash and Other (comments)     \"it's just too strong for me\"      MEDICATIONS PRIOR TO ADMISSION:  Prescriptions Prior to Admission   Medication Sig    aspirin 81 mg chewable tablet Take 81 mg by mouth daily.  atorvastatin (LIPITOR) 80 mg tablet Take 80 mg by mouth daily.  mirtazapine (REMERON) 15 mg tablet Take 15 mg by mouth nightly.     clonazePAM (KLONOPIN) 0.5 mg tablet Take 0.5 mg by mouth nightly as needed.  pantoprazole (PROTONIX) 40 mg tablet Take 40 mg by mouth two (2) times a day.  lactulose (CHRONULAC) 10 gram/15 mL solution Take 10 g by mouth daily (after breakfast).  cyanocobalamin, vitamin B-12, (VITAMIN B12 PO) Take 1 Tab by mouth daily. Strength unknown    calcium-vitamin D (OYSTER SHELL) 500 mg(1,250mg) -200 unit per tablet Take 1 Tab by mouth two (2) times daily (with meals). PAST MEDICAL HISTORY:states no psychiatric hx  Active Ambulatory Problems     Diagnosis Date Noted    No Active Ambulatory Problems     Resolved Ambulatory Problems     Diagnosis Date Noted    No Resolved Ambulatory Problems     Past Medical History:   Diagnosis Date    Acid reflux     Arthritis     Constipation     Diarrhea     Elevated cholesterol       Past Medical History:   Diagnosis Date    Acid reflux     Arthritis     Constipation     Diarrhea     Elevated cholesterol      Past Surgical History:   Procedure Laterality Date    HX HYSTERECTOMY        SOCIAL HISTORY: lives with  with a mountain of debt. Arthritis limiting her activity and contributing to her malaise  Social History     Social History Narrative     Social History   Substance Use Topics    Smoking status: Never Smoker    Smokeless tobacco: Never Used    Alcohol use No      FAMILY HISTORY: History reviewed. No pertinent family history. No family history on file. REVIEW of SYSTEMS:   Appetite slightly improved in hospital, not c/o being depressed, more dispirited, constipation still problem.   Pt attributes weight loss to not eating secondary to loss of appetite and constipation             MENTAL STATUS EXAM & VITALS       MENTAL STATUS EXAM:    FINDINGS WITHIN NORMAL LIMITS (WNL) UNLESS OTHERWISE STATED BELOW:    Orientation oriented to time, place and person   Vital Signs (BP,Pulse, Temp) See below (reviewed 8/7/2018)   Gait and Station Resting in bed comfortably   Abnormal Muscular Movements/Tone/Behavior No EPS, no Tardive Dyskinesia, minimal resting tremor increases with movement when anxiety present; wnl tone   Relations cooperative   General Appearance:  age appropriate and thin & gaunt looking   Language No aphasia or dysarthria   Speech:  normal pitch, normal volume and non-pressured   Thought Processes logical, wnl rate of thoughts, less than adequate abstract reasoning and computation   Thought Associations goal directed   Thought Content free of delusions, free of hallucinations and not internally preoccupied    Suicidal Ideations none   Homicidal Ideations none   Mood:  depressed   Affect:  anxious   Memory recent  fair   Memory remote:  as above   Concentration/Attention:  as above   Fund of Knowledge Fair/average   Insight:  {Psych insight & judgement:fair for psychologic factors, ok for general topics   Reliability fair secondary to lack of insight into clinical situation   Judgment:  As above          VITALS:     Patient Vitals for the past 24 hrs:   Temp Pulse Resp BP SpO2   08/07/18 2300 97.5 °F (36.4 °C) 93 18 (!) 147/91 100 %   08/07/18 1900 97.7 °F (36.5 °C) 75 20 107/59 95 %   08/07/18 1536 97.8 °F (36.6 °C) 72 17 100/54 99 %   08/07/18 1100 97.6 °F (36.4 °C) 68 17 101/60 99 %   08/07/18 0701 98.1 °F (36.7 °C) 78 18 127/69 100 %   08/07/18 0339 97.4 °F (36.3 °C) 66 14 134/71 100 %   08/06/18 2356 98.1 °F (36.7 °C) 62 16 94/59 99 %              DATA     LABORATORY DATA:  Labs Reviewed   CBC WITH AUTOMATED DIFF - Abnormal; Notable for the following:        Result Value    WBC 11.4 (*)     RBC 5.26 (*)     HCT 48.5 (*)     NEUTROPHILS 77 (*)     ABS.  NEUTROPHILS 8.8 (*)     All other components within normal limits   METABOLIC PANEL, COMPREHENSIVE - Abnormal; Notable for the following:     Glucose 118 (*)     BUN 30 (*)     BUN/Creatinine ratio 37 (*)     AST (SGOT) 57 (*)     All other components within normal limits   URINALYSIS W/MICROSCOPIC - Abnormal; Notable for the following:     Ketone TRACE (*)     All other components within normal limits   METABOLIC PANEL, BASIC - Abnormal; Notable for the following:     BUN 30 (*)     BUN/Creatinine ratio 42 (*)     All other components within normal limits   GLUCOSE, POC - Abnormal; Notable for the following:     Glucose (POC) 102 (*)     All other components within normal limits   GLUCOSE, POC - Abnormal; Notable for the following:     Glucose (POC) 101 (*)     All other components within normal limits   GLUCOSE, POC - Abnormal; Notable for the following:     Glucose (POC) 113 (*)     All other components within normal limits   GLUCOSE, POC - Abnormal; Notable for the following:     Glucose (POC) 113 (*)     All other components within normal limits   URINE CULTURE HOLD SAMPLE   TSH 3RD GENERATION   MAGNESIUM   PHOSPHORUS   SAMPLES BEING HELD   GLUCOSE, POC   GLUCOSE, POC   SAMPLE TO BLOOD BANK     Admission on 08/06/2018   Component Date Value Ref Range Status    Glucose (POC) 08/06/2018 102* 65 - 100 mg/dL Final    Performed by 08/06/2018 AGUILAR JEFFRIES   Final    WBC 08/06/2018 11.4* 3.6 - 11.0 K/uL Final    RBC 08/06/2018 5.26* 3.80 - 5.20 M/uL Final    HGB 08/06/2018 15.7  11.5 - 16.0 g/dL Final    HCT 08/06/2018 48.5* 35.0 - 47.0 % Final    MCV 08/06/2018 92.2  80.0 - 99.0 FL Final    MCH 08/06/2018 29.8  26.0 - 34.0 PG Final    MCHC 08/06/2018 32.4  30.0 - 36.5 g/dL Final    RDW 08/06/2018 12.4  11.5 - 14.5 % Final    PLATELET 91/18/0006 603  150 - 400 K/uL Final    MPV 08/06/2018 11.8  8.9 - 12.9 FL Final    NRBC 08/06/2018 0.0  0  WBC Final    ABSOLUTE NRBC 08/06/2018 0.00  0.00 - 0.01 K/uL Final    NEUTROPHILS 08/06/2018 77* 32 - 75 % Final    LYMPHOCYTES 08/06/2018 14  12 - 49 % Final    MONOCYTES 08/06/2018 8  5 - 13 % Final    EOSINOPHILS 08/06/2018 0  0 - 7 % Final    BASOPHILS 08/06/2018 0  0 - 1 % Final    IMMATURE GRANULOCYTES 08/06/2018 0  0.0 - 0.5 % Final    ABS. NEUTROPHILS 08/06/2018 8.8* 1.8 - 8.0 K/UL Final    ABS. LYMPHOCYTES 08/06/2018 1.6  0.8 - 3.5 K/UL Final    ABS. MONOCYTES 08/06/2018 0.9  0.0 - 1.0 K/UL Final    ABS. EOSINOPHILS 08/06/2018 0.1  0.0 - 0.4 K/UL Final    ABS. BASOPHILS 08/06/2018 0.0  0.0 - 0.1 K/UL Final    ABS. IMM. GRANS. 08/06/2018 0.0  0.00 - 0.04 K/UL Final    DF 08/06/2018 AUTOMATED    Final    Sodium 08/06/2018 141  136 - 145 mmol/L Final    Potassium 08/06/2018 4.0  3.5 - 5.1 mmol/L Final    Chloride 08/06/2018 104  97 - 108 mmol/L Final    CO2 08/06/2018 30  21 - 32 mmol/L Final    Anion gap 08/06/2018 7  5 - 15 mmol/L Final    Glucose 08/06/2018 118* 65 - 100 mg/dL Final    BUN 08/06/2018 30* 6 - 20 MG/DL Final    Creatinine 08/06/2018 0.81  0.55 - 1.02 MG/DL Final    BUN/Creatinine ratio 08/06/2018 37* 12 - 20   Final    GFR est AA 08/06/2018 >60  >60 ml/min/1.73m2 Final    GFR est non-AA 08/06/2018 >60  >60 ml/min/1.73m2 Final    Calcium 08/06/2018 10.0  8.5 - 10.1 MG/DL Final    Bilirubin, total 08/06/2018 0.9  0.2 - 1.0 MG/DL Final    ALT (SGPT) 08/06/2018 22  12 - 78 U/L Final    AST (SGOT) 08/06/2018 57* 15 - 37 U/L Final    Alk.  phosphatase 08/06/2018 72  45 - 117 U/L Final    Protein, total 08/06/2018 7.4  6.4 - 8.2 g/dL Final    Albumin 08/06/2018 4.0  3.5 - 5.0 g/dL Final    Globulin 08/06/2018 3.4  2.0 - 4.0 g/dL Final    A-G Ratio 08/06/2018 1.2  1.1 - 2.2   Final    Ventricular Rate 08/06/2018 92  BPM Final    Atrial Rate 08/06/2018 92  BPM Final    P-R Interval 08/06/2018 144  ms Final    QRS Duration 08/06/2018 76  ms Final    Q-T Interval 08/06/2018 356  ms Final    QTC Calculation (Bezet) 08/06/2018 440  ms Final    Calculated R Axis 08/06/2018 2  degrees Final    Calculated T Axis 08/06/2018 46  degrees Final    Diagnosis 08/06/2018    Final                    Value:Normal sinus rhythm  When compared with ECG of 27-JUL-2018 08:46,  No significant change was found  Confirmed by Cameron Billingsley M.D., Rob Joseph ([id number]) on 8/6/2018 9:40:02 PM      TSH 08/06/2018 0.75  0.36 - 3.74 uIU/mL Final    Color 08/06/2018 YELLOW/STRAW    Final    Appearance 08/06/2018 CLEAR  CLEAR   Final    Specific gravity 08/06/2018 1.022  1.003 - 1.030   Final    pH (UA) 08/06/2018 6.5  5.0 - 8.0   Final    Protein 08/06/2018 NEGATIVE   NEG mg/dL Final    Glucose 08/06/2018 NEGATIVE   NEG mg/dL Final    Ketone 08/06/2018 TRACE* NEG mg/dL Final    Bilirubin 08/06/2018 NEGATIVE   NEG   Final    Blood 08/06/2018 NEGATIVE   NEG   Final    Urobilinogen 08/06/2018 0.2  0.2 - 1.0 EU/dL Final    Nitrites 08/06/2018 NEGATIVE   NEG   Final    Leukocyte Esterase 08/06/2018 NEGATIVE   NEG   Final    WBC 08/06/2018 0-4  0 - 4 /hpf Final    RBC 08/06/2018 0-5  0 - 5 /hpf Final    Epithelial cells 08/06/2018 FEW  FEW /lpf Final    Bacteria 08/06/2018 NEGATIVE   NEG /hpf Final    Hyaline cast 08/06/2018 0-2  0 - 5 /lpf Final    Urine culture hold 08/06/2018 URINE ON HOLD IN MICROBIOLOGY DEPT FOR 3 DAYS. IF UNPRESERVED URINE IS SUBMITTED, IT CANNOT BE USED FOR ADDITIONAL TESTING AFTER 24 HRS, RECOLLECTION WILL BE REQUIRED.     Final    Sodium 08/07/2018 143  136 - 145 mmol/L Final    Potassium 08/07/2018 3.6  3.5 - 5.1 mmol/L Final    Chloride 08/07/2018 107  97 - 108 mmol/L Final    CO2 08/07/2018 28  21 - 32 mmol/L Final    Anion gap 08/07/2018 8  5 - 15 mmol/L Final    Glucose 08/07/2018 89  65 - 100 mg/dL Final    BUN 08/07/2018 30* 6 - 20 MG/DL Final    Creatinine 08/07/2018 0.72  0.55 - 1.02 MG/DL Final    BUN/Creatinine ratio 08/07/2018 42* 12 - 20   Final    GFR est AA 08/07/2018 >60  >60 ml/min/1.73m2 Final    GFR est non-AA 08/07/2018 >60  >60 ml/min/1.73m2 Final    Calcium 08/07/2018 9.2  8.5 - 10.1 MG/DL Final    Magnesium 08/07/2018 2.1  1.6 - 2.4 mg/dL Final    Phosphorus 08/07/2018 4.0  2.6 - 4.7 MG/DL Final    Glucose (POC) 08/07/2018 72  65 - 100 mg/dL Final    Performed by 08/07/2018 Froy Mendoza   Final    Glucose (POC) 08/07/2018 83  65 - 100 mg/dL Final    Performed by 08/07/2018 Froy Mendoza   Final    Glucose (POC) 08/07/2018 101* 65 - 100 mg/dL Final    Performed by 08/07/2018 Adria Ames   Final    Glucose (POC) 08/07/2018 113* 65 - 100 mg/dL Final    Performed by 08/07/2018 [facility]   Final    Glucose (POC) 08/07/2018 113* 65 - 100 mg/dL Final    Performed by 08/07/2018 VON TESSIE   Final        RADIOLOGY REPORTS:    Results from Hospital Encounter encounter on 08/06/18   XR CHEST PORT   Narrative INDICATION: Altered mental status. COMPARISON: July 27, 2018    FINDINGS: AP portable imaging of the chest performed at 6:19 PM demonstrates a  stable cardiomediastinal silhouette. The lungs are clear bilaterally. Degenerative changes are present in the shoulders and thoracic spine. Impression IMPRESSION: No evidence of acute cardiopulmonary process. Xr Abd Flat/ Erect    Result Date: 6/5/2018  EXAM:  XR ABD FLAT/ ERECT INDICATION:   constipation; abdominal pain COMPARISON: None. FINDINGS: Supine and upright views of the abdomen demonstrate a normal gas pattern. There is no free intraperitoneal air. There is mild fecal stasis throughout the colon. . There are degenerative changes lower lumbar spine. .     IMPRESSION: No obstruction or free air. There is mild fecal stasis throughout the colon. Mri Brain Wo Cont    Result Date: 8/7/2018  EXAM:  MRI BRAIN WO CONT Clinical history: Altered mental status INDICATION:  Altered mental status, patient presented with generalized tremors, shaking history, no loss of consciousness. COMPARISON: CT head 8/7/2018. CONTRAST:  None. TECHNIQUE: Sagittal T1, axial FLAIR, T2,T1 and gradient echo images as well as coronal T2 weighted images and axial diffusion weighted images of the head were obtained. FINDINGS: There is no Chiari or syrinx. Pituitary infundibulum unremarkable.  Minimal confluent periventricular and scattered foci of increased T2 signal intensity. There is no evidence of mass, hemorrhage, acute infarct or abnormal extra-axial fluid collection. Normal appearing flow-voids are present in the vertebral, basilar and carotid artery systems. The craniocervical junction is normal. The structures at the cranial base including paranasal sinuses and mastoid air cells are unremarkable. IMPRESSION: Minimal chronic microvascular ischemic change. No intracranial mass, hemorrhage or evidence of acute infarction      Ct Head Wo Cont    Result Date: 7/27/2018  EXAM:  CT HEAD WO CONT INDICATION:  GLF head trauma, dizziness resulting in ground level fall. Generalized weakness. TECHNIQUE: Thin axial images were obtained through the calvarium and saved with standard and bone window algorithm. Coronal and sagittal reconstructions were generated. CT dose reduction was achieved through use of a standardized protocol tailored for this examination and automatic exposure control for dose modulation. COMPARISON: None available. FINDINGS: The ventricular size and configuration are within normal limits. The cerebral density is normal throughout. No evidence of intracranial hemorrhage, acute infarct, mass or abnormal extra-axial fluid collections. Visualized osseous structures of the calvarium and skull base including paranasal sinuses are unremarkable. IMPRESSION: Normal head CT. Ct Chest Wo Cont    Result Date: 5/14/2018  EXAM:  CT CHEST WITHOUT CONTRAST INDICATION: Abnormal weight loss. COMPARISON: None. CONTRAST: None. TECHNIQUE: Unenhanced multislice helical CT was performed from the thoracic inlet to the adrenal glands without intravenous contrast administration. Contiguous 5 mm axial images were reconstructed and lung and soft tissue windows were generated. Coronal and sagittal reformations were generated.   CT dose reduction was achieved through use of a standardized protocol tailored for this examination and automatic exposure control for dose modulation. Adaptive statistical iterative reconstruction (ASIR) was utilized for this examination. FINDINGS: LOWER NECK: There are bilateral calcifications in the thyroid without a discrete mass or nodule. LUNGS: The lungs are clear of mass, nodule, airspace disease or edema. There is respiratory motion artifact on the images of the lower lobes. PLEURA: There is no pleural effusion or pneumothorax. The absence of intravenous contrast reduces the sensitivity for evaluation of the mediastinum and upper abdominal organs. AORTA: The aorta has a normal contour with no evidence of aneurysm. There is aortic valve and minimal coronary artery calcification. MEDIASTINUM: No mediastinal or hilar or axillary adenopathy is appreciated. BONES AND SOFT TISSUES: There are degenerative changes of the spine. UPPER ABDOMEN: The visualized portions of the upper abdominal organs are normal.     IMPRESSION: 1. No acute abnormality. 2. Thyroid calcifications. 3. Atherosclerotic aorta with aortic valve and coronary artery calcifications. 4. Thoracic spondylosis. Ct Spine Cerv Wo Cont    Result Date: 7/27/2018  EXAM:  CT SPINE CERV WO CONT INDICATION:  Dizziness resulting in ground level fall with head/neck trauma/injury. TECHNIQUE: Thin spiral axial images were obtained from the posterior fossa to the upper thoracic spine. Sagittal and coronal 2D reconstructions were created. CT dose reduction was achieved through use of a standardized protocol tailored for this examination and automatic exposure control for dose modulation. COMPARISON: None available. FINDINGS: Chronic degenerative changes at the articulation of the odontoid and the anterior arch of C1. Craniocervical junction is otherwise unremarkable and the odontoid is intact. No findings of fracture, dislocation or other acute osseous abnormality. Mild chronic findings of cervical spondylosis and facet arthrosis.  Chronic appearing thyroid calcifications incidentally noted. IMPRESSION: 1. Mild chronic cervical spondylosis and facet arthrosis. 2. No acute osseous abnormality demonstrated. Ct Abd Pelv W Cont    Result Date: 7/27/2018  EXAM:  CT ABD PELV W CONT INDICATION: abdominal pain  , patient presents after falling to the ED with additional history of \"not eating or drinking or having bowel movement \"in a while\" \". Abdominal pain with some generalized tenderness on physical exam. COMPARISON: Abdominal x-ray 6/5/18, renal ultrasound 6/18/18 and chest CT 5/14/18 CONTRAST:  100 mL of Isovue-370. TECHNIQUE: Following the uneventful intravenous administration of contrast, thin axial images were obtained through the abdomen and pelvis. Coronal and sagittal reconstructions were generated. Oral contrast was not administered. CT dose reduction was achieved through use of a standardized protocol tailored for this examination and automatic exposure control for dose modulation. FINDINGS: LUNG BASES: Clear. INCIDENTALLY IMAGED HEART AND MEDIASTINUM: Unremarkable. LIVER: No mass or biliary dilatation. GALLBLADDER: Unremarkable. SPLEEN: No mass. PANCREAS: No mass or ductal dilatation. ADRENALS: Unremarkable. KIDNEYS: No renal mass or calcification. No hydronephrosis. STOMACH: Unremarkable. SMALL BOWEL: No dilatation or wall thickening. COLON: No dilatation or wall thickening. APPENDIX: Unremarkable. PERITONEUM: No ascites or pneumoperitoneum. RETROPERITONEUM: No lymphadenopathy or aortic aneurysm. REPRODUCTIVE ORGANS: Patient is had a hysterectomy. URINARY BLADDER: Prominent distention of the bladder. No bladder wall thickening or calcifications. BONES: Chronic degenerative changes lower lumbar spine with mild stenosis L4-5. IMPRESSION: 1. Prominent distention of the bladder. Note similar findings. Evaluation 6/18/18 renal ultrasound. 2. Otherwise no acute abdominal or pelvic abnormality demonstrated.     Us Retroperitoneum Comp    Result Date: 6/18/2018  Indication: Acute urinary retention Comparison to 6/4/2018 Realtime sonographic imaging of the retroperitoneum was performed. The right kidney measures 7.7 cm and the left kidney measures 8.4 cm. They are normal in size and appearance without evidence of mass lesion, hydronephrosis, or calcification. The bladder is unremarkable as is the visualized portion of the abdominal aorta, common iliac artery bifurcation, and IVC. IMPRESSION: Normal renal ultrasound. Us Retroperitoneum Comp    Result Date: 6/4/2018  History: Positive urine cultures. Ultrasonography of the retroperitoneum demonstrates that the inferior vena cava appears normal. The aorta and bifurcation appear normal. The right kidney measures 8.2 cm and the left kidney measures 9.2 cm. The kidneys are normal in echotexture and there is no hydronephrosis or renal mass. The bladder is decompressed. IMPRESSION: Unremarkable retroperitoneal ultrasound. Xr Chest Port    Result Date: 8/6/2018  INDICATION: Altered mental status. COMPARISON: July 27, 2018 FINDINGS: AP portable imaging of the chest performed at 6:19 PM demonstrates a stable cardiomediastinal silhouette. The lungs are clear bilaterally. Degenerative changes are present in the shoulders and thoracic spine. IMPRESSION: No evidence of acute cardiopulmonary process. Xr Chest Port    Result Date: 7/27/2018  EXAM:  XR CHEST PORT INDICATION:  Dizziness and fall. COMPARISON: CT chest 5/14/2018 TECHNIQUE: Portable AP upright chest view at 0911 hours FINDINGS: Cardiac monitoring wires overlie the thorax. The cardiomediastinal and hilar contours are within normal limits. The pulmonary vasculature is within normal limits. The lungs and pleural spaces are clear. There is no pneumothorax. The visualized bones and upper abdomen are age-appropriate. IMPRESSION: No acute process.      Ct Code Neuro Head Wo Contrast    Result Date: 8/6/2018  HEAD CT WITHOUT CONTRAST: 8/6/2018 5:09 PM INDICATION: Altered mental status, code stroke. COMPARISON: 7/27/2018. PROCEDURE: Axial images of the head were obtained without contrast. Coronal and sagittal reformats were performed. CT dose reduction was achieved through use of a standardized protocol tailored for this examination and automatic exposure control for dose modulation. Adaptive statistical iterative reconstruction (ASIR) was utilized. FINDINGS: The ventricles and sulci are appropriate in size and configuration for age. No loss of gray-white differentiation to suggest late acute or early subacute infarction. No mass effect or intracranial hemorrhage. Left greater than right temporomandibular osteoarthritis. IMPRESSION: No acute intracranial abnormality.              MEDICATIONS       ALL MEDICATIONS  Current Facility-Administered Medications   Medication Dose Route Frequency    aspirin chewable tablet 81 mg  81 mg Oral DAILY    atorvastatin (LIPITOR) tablet 80 mg  80 mg Oral DAILY    clonazePAM (KlonoPIN) tablet 0.5 mg  0.5 mg Oral QHS PRN    lactulose (CHRONULAC) solution 10 g  10 g Oral DAILY AFTER BREAKFAST    mirtazapine (REMERON) tablet 15 mg  15 mg Oral QHS    sodium chloride (NS) flush 5-10 mL  5-10 mL IntraVENous Q8H    sodium chloride (NS) flush 5-10 mL  5-10 mL IntraVENous PRN    enoxaparin (LOVENOX) injection 40 mg  40 mg SubCUTAneous Q24H    0.9% sodium chloride infusion  75 mL/hr IntraVENous CONTINUOUS    ibuprofen (MOTRIN) tablet 400 mg  400 mg Oral Q4H PRN    ondansetron (ZOFRAN) injection 4 mg  4 mg IntraVENous Q4H PRN    pantoprazole (PROTONIX) tablet 40 mg  40 mg Oral BID    insulin lispro (HUMALOG) injection   SubCUTAneous AC&HS    glucose chewable tablet 16 g  4 Tab Oral PRN    dextrose (D50W) injection syrg 12.5-25 g  12.5-25 g IntraVENous PRN    glucagon (GLUCAGEN) injection 1 mg  1 mg IntraMUSCular PRN      SCHEDULED MEDICATIONS  Current Facility-Administered Medications Medication Dose Route Frequency    aspirin chewable tablet 81 mg  81 mg Oral DAILY    atorvastatin (LIPITOR) tablet 80 mg  80 mg Oral DAILY    lactulose (CHRONULAC) solution 10 g  10 g Oral DAILY AFTER BREAKFAST    mirtazapine (REMERON) tablet 15 mg  15 mg Oral QHS    sodium chloride (NS) flush 5-10 mL  5-10 mL IntraVENous Q8H    enoxaparin (LOVENOX) injection 40 mg  40 mg SubCUTAneous Q24H    0.9% sodium chloride infusion  75 mL/hr IntraVENous CONTINUOUS    pantoprazole (PROTONIX) tablet 40 mg  40 mg Oral BID    insulin lispro (HUMALOG) injection   SubCUTAneous AC&HS                ASSESSMENT & PLAN        The patient Zuleika Doll is a 69 y.o.  female who presents at this time for treatment of the following diagnoses:  Patient Active Hospital Problem List:   Aphasia (8/6/2018)    Assessment: resolved. Likely anxiety. Daughter notes hx of mild tremors through the years intensified by anxiety    Plan: none  Weight loss, connstipation    As per medical team as to work-up. Could well be somatic sx secondary to anxiety and depressive condition. Antidepressant/anxiolytic would be choice and mirtazapine a good choice but with pt stating intolerant may be best to start paroxetine as the most calming, sedating (pt notes chronic insomnia), appetite increasing of SSRIs. 5 mg enough as starting dose. 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 May be influenced by depression but seems to probably have independent variable and will need monitoring. A coordinated, multidisplinary treatment team round was conducted with the patient; that includes the nurse, unit pharmcist,  and writer all present. The following regarding medications was addressed during rounds with patient:   the risks and benefits of the proposed medication. The patient was given the opportunity to ask questions. Informed consent given to the use of the above medications.      I will continue to adjust psychiatric and non-psychiatric medications (see above \"medication\" section and orders section for details) as deemed appropriate & based upon diagnoses and response to treatment. I have reviewed admission (and previous/old) labs and medical tests in the EHR and or transferring hospital documents. I will continue to order blood tests/labs and diagnostic tests as deemed appropriate and review results as they become available (see orders for details). I have reviewed old psychiatric and medical records available in the EHR. I Will order additional psychiatric records from other institutions to further elucidate the nature of patient's psychopathology and review once available. I will gather additional collateral information from friends, family and o/p treatment team to further elucidate the nature of patient's psychopathology and baselline level of psychiatric functioning.                      SIGNED:    Nira Moritz, MD  8/7/2018

## 2018-08-08 NOTE — PROGRESS NOTES
Bedside shift change report given to Jordin Head (oncoming nurse) by Kelsy Alejandro (offgoing nurse). Report included the following information SBAR, Kardex, Intake/Output and MAR.

## 2018-08-08 NOTE — PROGRESS NOTES
Cm received notice that [facility] has accepted this pt pending auth. [facility] is working on getting [facility]. Will follow.  Emir Gonsales

## 2018-08-08 NOTE — ROUTINE PROCESS
IDR Summary          Patient: Minh Riojas MRN: [id number]    Age: 69 y.o. YOB: 1940 Room/Bed: [facility]   Admit Diagnosis: Aphasia  Aphasia  Principal Diagnosis: Aphasia   [facility]: Attending [address]  PCP: Tan Maravilla MD    Readmission: NO          Testing due for pt today? YES    Discharge plan for the day: Echo, Cardiology consult       Discharge plan for the stay: r/o stroke    Discharge plan for the way: ambulance transport to SNF?     Signed:     Rudean Curling  8/8/2018  12:20 PM

## 2018-08-08 NOTE — PROGRESS NOTES
Problem: Falls - Risk of  Goal: *Absence of Falls  Document Derian Fall Risk and appropriate interventions in the flowsheet.    Outcome: Progressing Towards Goal  Fall Risk Interventions:  Mobility Interventions: Bed/chair exit alarm, OT consult for ADLs, Patient to call before getting OOB, PT Consult for mobility concerns, PT Consult for assist device competence, Utilize walker, cane, or other assistive device    Mentation Interventions: Adequate sleep, hydration, pain control, Bed/chair exit alarm, Door open when patient unattended, More frequent rounding, Increase mobility, Reorient patient, Toileting rounds, Update white board, Room close to nurse's station    Medication Interventions: Assess postural VS orthostatic hypotension, Patient to call before getting OOB, Teach patient to arise slowly, Bed/chair exit alarm    Elimination Interventions: Bed/chair exit alarm, Call light in reach, Patient to call for help with toileting needs, Toileting schedule/hourly rounds    History of Falls Interventions: Bed/chair exit alarm, Consult care management for discharge planning, Door open when patient unattended, Investigate reason for fall, Room close to nurse's station

## 2018-08-08 NOTE — PROGRESS NOTES
Bedside and Verbal shift change report given to Montserrat Perez (oncoming nurse) by Janis Khan RN (offgoing nurse). Report included the following information SBAR, Kardex, Intake/Output, MAR, Med Rec Status and Cardiac Rhythm NSR.

## 2018-08-08 NOTE — PROGRESS NOTES
Entered patients room to perform 0300 vital signs, patient's heart rate 150. Patient's telemetry leads checked and heart rate was found to be in the 100's to 150's. MD called and new orders received.

## 2018-08-08 NOTE — PROGRESS NOTES
Hospitalist Progress Note  Libia Farrell MD  Answering service: [phone] from in house phone        Date of Service:  2018  NAME:  Zurdo Miller YOB: 1940  MRN:  [id number]      Admission Summary:   69 y.o lady with hyperlipidemia who presented after an episode of generalized shaking. She is currently not speaking at all and doesn't provide any history. Her  says that earlier today she had shaking of her arms and legs, she didn't lose consciousness, he is unsure how long it lasted for. He states that after the shaking subsided, she stopped talking. She hasn't had any acute complaints recently. However, she's apparently had a 40 lb weight loss over the past 4 months. Her  also endorses that she has been under a lot of stress due to finances and other personal issues.       Interval history / Subjective:     No further episodes of speech impairment     Assessment & Plan:     Mutism POA The patient was not making an effort to speak. Resolved since admission. MRI brain negative for acute process. Appreciate input from psychiatry. Prozac started for anxiety. She also seem to have some some cognitive impairment. Advised to follow with psychiatry as outpatient. Continue ASA and statin. SVT, paroxsysmal. Cardiology to follow. Follow echo results and cardiology input.     Severe protein-calorie malnutrition: based on poor PO intake and marked weight loss. -consult nutrition    Deconditioning: will benefit from SNF, case management consulted. Code status: Full  DVT prophylaxis: lovenox    Care Plan discussed with: Patient/Family  Disposition: TBD     Hospital Problems  Never Reviewed          Codes Class Noted POA    * (Principal)Aphasia ICD-10-CM: R47.01  ICD-9-CM: 784.3  2018 Yes                Review of Systems:   Not required       Vital Signs:    Last 24hrs VS reviewed since prior progress note.  Most recent are:  Visit Vitals    BP 98/51 (BP 1 Location: Right arm, BP Patient Position: At rest;Head of bed elevated (Comment degrees))    Pulse 68    Temp 97.8 °F (36.6 °C)    Resp 14    Ht 5' (1.524 m)    Wt 46.3 kg (102 lb)    SpO2 97%    BMI 19.92 kg/m2         Intake/Output Summary (Last 24 hours) at 08/08/18 1235  Last data filed at 08/08/18 0800   Gross per 24 hour   Intake              360 ml   Output                0 ml   Net              360 ml        Physical Examination:             Constitutional:  No acute distress, cooperative, pleasant    ENT:  Oral mucous moist, oropharynx benign. Neck supple,    Resp:  CTA bilaterally. No wheezing/rhonchi/rales. No accessory muscle use   CV:  Regular rhythm, normal rate, no murmurs, gallops, rubs    GI:  Soft, non distended, non tender. normoactive bowel sounds, no hepatosplenomegaly     Musculoskeletal:  No edema, warm, 2+ pulses throughout    Neurologic:  Non-focal            Data Review:    Review and/or order of clinical lab test      Labs:     Recent Labs      08/08/18 0314 08/06/18 1738   WBC  10.0  11.4*   HGB  14.5  15.7   HCT  45.6  48.5*   PLT  182  220     Recent Labs      08/08/18 0314 08/07/18   0307  08/06/18   1738   NA  143  143  141   K  4.0  3.6  4.0   CL  108  107  104   CO2  24  28  30   BUN  24*  30*  30*   CREA  0.77  0.72  0.81   GLU  100  89  118*   CA  8.6  9.2  10.0   MG  1.9  2.1   --    PHOS   --   4.0   --      Recent Labs      08/06/18   1738   SGOT  57*   ALT  22   AP  72   TBILI  0.9   TP  7.4   ALB  4.0   GLOB  3.4     No results for input(s): INR, PTP, APTT in the last 72 hours. No lab exists for component: INREXT, INREXT   No results for input(s): FE, TIBC, PSAT, FERR in the last 72 hours. No results found for: FOL, RBCF   No results for input(s): PH, PCO2, PO2 in the last 72 hours.   Recent Labs      08/08/18 0314   TROIQ  <0.05     No results found for: CHOL, CHOLX, CHLST, CHOLV, HDL, LDL, LDLC, DLDLP, TGLX, TRIGL, [facility], Olene Silence  Lab Results   Component Value Date/Time    Glucose (POC) 94 08/08/2018 06:24 AM    Glucose (POC) 113 (H) 08/07/2018 09:20 PM    Glucose (POC) 113 (H) 08/07/2018 04:43 PM    Glucose (POC) 101 (H) 08/07/2018 11:46 AM    Glucose (POC) 83 08/07/2018 07:36 AM     Lab Results   Component Value Date/Time    Color YELLOW/STRAW 08/06/2018 07:09 PM    Appearance CLEAR 08/06/2018 07:09 PM    Specific gravity 1.022 08/06/2018 07:09 PM    pH (UA) 6.5 08/06/2018 07:09 PM    Protein NEGATIVE  08/06/2018 07:09 PM    Glucose NEGATIVE  08/06/2018 07:09 PM    Ketone TRACE (A) 08/06/2018 07:09 PM    Bilirubin NEGATIVE  08/06/2018 07:09 PM    Urobilinogen 0.2 08/06/2018 07:09 PM    Nitrites NEGATIVE  08/06/2018 07:09 PM    Leukocyte Esterase NEGATIVE  08/06/2018 07:09 PM    Epithelial cells FEW 08/06/2018 07:09 PM    Bacteria NEGATIVE  08/06/2018 07:09 PM    WBC 0-4 08/06/2018 07:09 PM    RBC 0-5 08/06/2018 07:09 PM         Medications Reviewed:     Current Facility-Administered Medications   Medication Dose Route Frequency    amiodarone (CORDARONE) 375 mg/250 mL D5W infusion  0.5-1 mg/min IntraVENous TITRATE    PARoxetine hcl (PAXIL) 10 mg/5 mL oral suspension 5 mg  5 mg Oral DAILY    aspirin chewable tablet 81 mg  81 mg Oral DAILY    atorvastatin (LIPITOR) tablet 80 mg  80 mg Oral DAILY    clonazePAM (KlonoPIN) tablet 0.5 mg  0.5 mg Oral QHS PRN    lactulose (CHRONULAC) solution 10 g  10 g Oral DAILY AFTER BREAKFAST    sodium chloride (NS) flush 5-10 mL  5-10 mL IntraVENous Q8H    sodium chloride (NS) flush 5-10 mL  5-10 mL IntraVENous PRN    enoxaparin (LOVENOX) injection 40 mg  40 mg SubCUTAneous Q24H    0.9% sodium chloride infusion  75 mL/hr IntraVENous CONTINUOUS    ibuprofen (MOTRIN) tablet 400 mg  400 mg Oral Q4H PRN    ondansetron (ZOFRAN) injection 4 mg  4 mg IntraVENous Q4H PRN    pantoprazole (PROTONIX) tablet 40 mg  40 mg Oral BID    glucose chewable tablet 16 g  4 Tab Oral PRN    dextrose (D50W) injection syrg 12.5-25 g  12.5-25 g IntraVENous PRN    glucagon (GLUCAGEN) injection 1 mg  1 mg IntraMUSCular PRN     ______________________________________________________________________  EXPECTED LENGTH OF STAY: - - -  ACTUAL LENGTH OF STAY:          Araceli Hardin MD

## 2018-08-08 NOTE — PROGRESS NOTES
Called by nursing staff. Re: fluctuating HR 's and BP 98/72. No CP, SOB, or acute distress. Pt seen and evaluated. VS: BP 98/72   RR16   O2sat 98 on RA  PE:  GEN-NAD  PSYCH-A&Ox3  NEURO-GCS      (E V  M). MAEX   . HEENT-NCAT/pupils 2 mm reactive bilateral. Oropharynx; clear and dry mucous membranes  NECK-supple, trachea midline  LUNGS-CTA B  HEART- irregular rate and rhythm  ABD-soft, NT,ND,  BS.  No R/G/R  VASCULAR-2+ radial/1+DP pulses bilateral. No C/C/E  SKIN-warm/dry; decreased skin turgor    A/P:    New onset AFib with RVR  12 lead EKG showed Afib RVR; looks clinically dry.   -Saline bolus  -Will start on Amio  due to low blood pressure  -Cardiology consult  -Echo  -Check labs

## 2018-08-08 NOTE — PROGRESS NOTES
Reason for Admission:  Aphasia                    RRAT Score:      7               Plan for utilizing home health:     No                     Likelihood of Readmission:  low                         Transition of Care Plan:           CM met with pt ,  and daughter, Cesario Del Rio ([phone]) to introduce them to the role of CM and transition of care. All verbalized understanding. This pt lives at home with her  and has been fairly independent. CM informed them that therapy is recommending SNF rehab for her. All are in agreement with this recommendation and would like a referral to go to [facility] which is close to their home. CM sent a referral to [facility] via [employer]. [address] Management Interventions  PCP Verified by CM:  Yes  Palliative Care Criteria Met (RRAT>21 & CHF Dx)?: No  Transition of Care Consult (CM Consult): Discharge Planning  MyChart Signup: No  Discharge Durable Medical Equipment: No  Physical Therapy Consult: Yes  Occupational Therapy Consult: Yes  Speech Therapy Consult: No  Current Support Network: Lives with Spouse  Confirm Follow Up Transport: Family  Plan discussed with Pt/Family/Caregiver: Yes  Freedom of Choice Offered: Yes  [address] Provided?: No

## 2018-08-08 NOTE — PROGRESS NOTES
NUTRITION brief       Pt and  visited today. Coupons for Ensure shakes provided for use after discharge since has been doing well with shakes at home but cost is a challenge    Pt did fair with breakfast this morning. Discussed snacks between meals and pt agreeable to yogurt and Nutri-grain bars. Will continue to follow for PO intake (see full assessment from yesterday for additional details).      Miranda Noyola RD

## 2018-08-08 NOTE — PROGRESS NOTES
Order received for stat EKG, EKG performed and A-Fib with RVR was shown. NP Self was paged and informed of result, new orders received (see MAR).   Will continue to [facility]

## 2018-08-08 NOTE — PROGRESS NOTES
Problem: Falls - Risk of  Goal: *Absence of Falls  Document Derian Fall Risk and appropriate interventions in the flowsheet. Outcome: Progressing Towards Goal  Fall Risk Interventions:  Mobility Interventions: Bed/chair exit alarm, Patient to call before getting OOB, Utilize walker, cane, or other assistive device    Mentation Interventions: Adequate sleep, hydration, pain control, Bed/chair exit alarm, Reorient patient, More frequent rounding    Medication Interventions: Patient to call before getting OOB, Bed/chair exit alarm    Elimination Interventions: Bed/chair exit alarm    History of Falls Interventions: Bed/chair exit alarm        Problem: Pressure Injury - Risk of  Goal: *Prevention of pressure injury  Document James Scale and appropriate interventions in the flowsheet.    Outcome: Progressing Towards Goal  Pressure Injury Interventions:       Moisture Interventions: Absorbent underpads, Assess need for specialty bed, Maintain skin hydration (lotion/cream), Minimize layers    Activity Interventions: Assess need for specialty bed, Pressure redistribution bed/mattress(bed type)    Mobility Interventions: HOB 30 degrees or less, Float heels    Nutrition Interventions: Document food/fluid/supplement intake    Friction and Shear Interventions: Lift sheet, HOB 30 degrees or less

## 2018-08-08 NOTE — CONSULTS
Cardiology Consult Note      Patient Name: Romulo Mcfadden  : 1940 MRN: [id number]  Date: 2018  Time: 11:11 AM    Admit Diagnosis: Aphasia  Aphasia    Primary Cardiologist: none   Consulting Cardiologist: Gloriajean Gaw. Blane Carrel M.D.     Reason for Consult: Afib    Requesting MD: Aquiles, NP    HPI:  Romulo Mcfadden is a 69 y.o. female admitted on 2018  for Aphasia  Aphasia. has a past medical history of Acid reflux; Arthritis; Constipation; Diarrhea; and Elevated cholesterol. Presented for \"shaking\". Admitted for evaluation for stroke/TIA. She was non verbal at presentation. She has had 40 pound weight loss over last 4 months. Her  noted she has been under a lot of stress. Seen by Psychiatry, she has been dx with anxiety with associated somatic sx. Last evening, apparently had an episode of tachycardia with rate in the 150s. EKG noted Afib. Patient speaking today.  at bedside. She denies any cardiac history or feeling palpitations or her heart racing, ever. She has had some dizziness of recent, causing her to fall, last week. Does have a h/o dyslipidemia. Subjective:  Currently denies CP, SOB or palpitations. Assessment and Plan     1. PSVT noted on telemetry   - Not afib with RVR   - complexes regular. P wave noted on breaks of SVT   - echo completed. Bedside read with normal EF, some thickened valves. Official report to follow   - currently NSR with PACs. HR 69.   - Will stop Amio gtt  2. Protein calorie malnutrition. Body mass index is 19.92 kg/(m^2). RD seeing  3. Dyslipidemia   - Lipitor 80 mg daily    EKG and telemetry reviewed. Episodes of tachycardia are PSVT and not Afib. Will follow up echo results. No need for [address]. Would start low dose BB, but BP low. She is non compliant with meds, mostly due to forgetfulness, so will not add Digoxin 3x/week. Risk of Dig toxicity.   Continue current treatment and resolve malnutrition. Needs no further cardiac testing. Will see again as needed. Saw and evaluated pt and agree with above assessment and plan. Pt did not have afib but brief SVT at times. Echo was normal.  No cardiac indication for [facility]. BP too low for rate control meds and h/o non compliance. No additional cardiac evaluation indicated and will sign off.   Bernard Castaneda MD        Review of Systems:     GENERAL   Recent weight loss - yes   Fever -----------------   no   Chills -----------------   no     EYES, VISION   Visual Changes - no     EARS, NOSE, THROAT   Hearing loss ----------- no   Swallowing difficulties - no     CARDIOVASCULAR   Chest pain/pressure ---- no   Arrhythmia/palpitations - no       RESPIRATORY   Cough ------------------ no   Shortness of breath - no   Wheezing -------------- no   GASTROINTESTINAL   Abdominal pain - no   Heartburn -------- no   Bloody stool ----- no     GENITOURINARY   Frequent urination - no   Urgency -------------- no     MUSCULOSKELETAL   Joint pain/swelling ---- no   Musculoskeletal pain - no     SKIN & INTEGUMENTARY   Rashes - no   Sores --- no         NEUROLOGICAL   Numbness/tingling - no   Sensation loss ------ no     PSYCHIATRIC   Nervousness/anxiety - no   Depression -------------- no     ENDOCRINE   Heat/cold intolerance - no   Excessive thirst -------- no     HEMATOLOGIC/LYMPHATIC   Abnormal bleeding - no     ALL/IMMUN   Allergic reaction ------ no   Recurrent infections - no     Previous treatment/evaluation includes echocardiogram .  Cardiac risk factors: dyslipidemia, post-menopausal.    Past Medical History:   Diagnosis Date    Acid reflux     Arthritis     Constipation     Diarrhea     Elevated cholesterol      Past Surgical History:   Procedure Laterality Date    HX HYSTERECTOMY       Current Facility-Administered Medications   Medication Dose Route Frequency    amiodarone (CORDARONE) 375 mg/250 mL D5W infusion  0.5-1 mg/min IntraVENous TITRATE    PARoxetine hcl (PAXIL) 10 mg/5 mL oral suspension 5 mg  5 mg Oral DAILY    aspirin chewable tablet 81 mg  81 mg Oral DAILY    atorvastatin (LIPITOR) tablet 80 mg  80 mg Oral DAILY    clonazePAM (KlonoPIN) tablet 0.5 mg  0.5 mg Oral QHS PRN    lactulose (CHRONULAC) solution 10 g  10 g Oral DAILY AFTER BREAKFAST    sodium chloride (NS) flush 5-10 mL  5-10 mL IntraVENous Q8H    sodium chloride (NS) flush 5-10 mL  5-10 mL IntraVENous PRN    enoxaparin (LOVENOX) injection 40 mg  40 mg SubCUTAneous Q24H    0.9% sodium chloride infusion  75 mL/hr IntraVENous CONTINUOUS    ibuprofen (MOTRIN) tablet 400 mg  400 mg Oral Q4H PRN    ondansetron (ZOFRAN) injection 4 mg  4 mg IntraVENous Q4H PRN    pantoprazole (PROTONIX) tablet 40 mg  40 mg Oral BID    glucose chewable tablet 16 g  4 Tab Oral PRN    dextrose (D50W) injection syrg 12.5-25 g  12.5-25 g IntraVENous PRN    glucagon (GLUCAGEN) injection 1 mg  1 mg IntraMUSCular PRN       Allergies   Allergen Reactions    Tylenol [Acetaminophen] Rash and Other (comments)     \"it's just too strong for me\"      No family history on file. Social History     Social History    Marital status:      Spouse name: N/A    Number of children: N/A    Years of education: N/A     Social History Main Topics    Smoking status: Never Smoker    Smokeless tobacco: Never Used    Alcohol use No    Drug use: Not on file    Sexual activity: Not on file     Other Topics Concern    Not on file     Social History Narrative       Objective:    Physical Exam    Vitals:   Vitals:    08/07/18 2300 08/08/18 0226 08/08/18 0650 08/08/18 1100   BP: (!) 147/91 98/72 99/69 98/51   Pulse: 93 (!) 115 (!) 50 68   Resp: 18 16 16 14   Temp: 97.5 °F (36.4 °C)  97.5 °F (36.4 °C) 97.8 °F (36.6 °C)   SpO2: 100%  95% 97%   Weight:  102 lb (46.3 kg)     Height:           General:    Alert, cooperative, no distress, appears stated age. Neck:   Supple, no JVD.    Back: Symmetric, mild curvature. Lungs:     Clear to auscultation bilaterally. Heart[de-identified]    Regular rate and rhythm, S1, S2 normal, no murmur, click, rub or gallop. Abdomen:     Soft, non-tender. Bowel sounds normal.    Extremities:   Extremities normal, atraumatic, no cyanosis or edema. Vascular:   Pulses - 2+ radials   Skin:   Skin color normal. No rashes or lesions   Neurologic:   CN II-XII grossly intact. Telemetry: normal sinus rhythm with PACs    ECG:   EKG Results     Procedure 720 Value Units Date/Time    SCANNED CARDIAC RHYTHM STRIP [[id number]] Collected:  08/08/18 0441    Order Status:  Completed Updated:  08/08/18 0454    ECG RHYTHM ANALYSIS ADULT [[id number]] Collected:  08/08/18 0252    Order Status:  Completed Updated:  08/08/18 0257     Ventricular Rate 142 BPM      Atrial Rate 80 BPM      QRS Duration 76 ms      Q-T Interval 296 ms      QTC Calculation (Bezet) 455 ms      Calculated R Axis 23 degrees      Calculated T Axis 44 degrees      Diagnosis --     Atrial fibrillation with rapid ventricular response  When compared with ECG of 06-AUG-2018 17:32,  Atrial fibrillation has replaced Sinus rhythm  Vent.  rate has increased BY  50 BPM      ECG RHYTHM ANALYSIS ADULT [[id number]]     Order Status:  Sent     EKG 12 LEAD INITIAL [[id number]] Collected:  08/06/18 1732    Order Status:  Completed Updated:  08/06/18 2140     Ventricular Rate 92 BPM      Atrial Rate 92 BPM      P-R Interval 144 ms      QRS Duration 76 ms      Q-T Interval 356 ms      QTC Calculation (Bezet) 440 ms      Calculated R Axis 2 degrees      Calculated T Axis 46 degrees      Diagnosis --     Normal sinus rhythm  When compared with ECG of 27-JUL-2018 08:46,  No significant change was found  Confirmed by Mick Brewster M.D., Veronda Millin ([id number]) on 8/6/2018 9:40:02 PM              Data Review:     Radiology:   XR Results (most recent):    Results from Hospital Encounter encounter on 08/06/18   XR CHEST PORT   Narrative INDICATION: Altered mental status. COMPARISON: July 27, 2018    FINDINGS: AP portable imaging of the chest performed at 6:19 PM demonstrates a  stable cardiomediastinal silhouette. The lungs are clear bilaterally. Degenerative changes are present in the shoulders and thoracic spine. Impression IMPRESSION: No evidence of acute cardiopulmonary process. Recent Labs      08/08/18   0314   TROIQ  <0.05     Recent Labs      08/08/18   0314  08/07/18   0307   NA  143  143   K  4.0  3.6   CL  108  107   CO2  24  28   BUN  24*  30*   CREA  0.77  0.72   GLU  100  89   PHOS   --   4.0   CA  8.6  9.2     Recent Labs      08/08/18   0314  08/06/18   1738   WBC  10.0  11.4*   HGB  14.5  15.7   HCT  45.6  48.5*   PLT  182  220     Recent Labs      08/06/18   1738   SGOT  57*   AP  72     No results for input(s): CHOL, LDLC in the last 72 hours. No lab exists for component: TGL, HDLC,  HBA1C  Recent Labs      08/06/18   1739   TSH  0.75       Tyler R. Vila Brittle Alvis Romney M.D.           [facility] [address] [place] [address] Rd[address] [facility] [address], [address] [facility] [address]     Riky Rodriguez     [phone]    Shad Enciso MD

## 2018-08-08 NOTE — PROGRESS NOTES
Problem: Mobility Impaired (Adult and Pediatric)  Goal: *Acute Goals and Plan of Care (Insert Text)  Physical Therapy Goals  Initiated 8/7/2018  1. Patient will move from supine to sit and sit to supine  and scoot up and down in bed with minimal assistance/contact guard assist within 7 day(s). 2.  Patient will transfer from bed to chair and chair to bed with minimal assistance/contact guard assist using the least restrictive device within 7 day(s). 3.  Patient will perform sit to stand with minimal assistance/contact guard assist within 7 day(s). 4.  Patient will ambulate with minimal assistance/contact guard assist for 50 feet with the least restrictive device within 7 day(s). 5.  Patient will ascend/descend 2 stairs with handrail(s) with minimal assistance/contact guard assist within 7 day(s). 6.  Patient will improve Candelario Balance score by 7 points within 7 days. physical Therapy TREATMENT  Patient: Palak Laird (34 y.o. female)  Date: 8/8/2018  Diagnosis: Aphasia  Aphasia Aphasia       Precautions: Fall, Seizure  Chart, physical therapy assessment, plan of care and goals were reviewed. ASSESSMENT:  Cleared for mobility by RN. Received up in chair - BP and HR improved compared to this AM. Agreeable to participation in session. Introduced RW and note marked improvement in stability and confidence. Able to complete sit<>stands from chair with CGA and cues for hand placement sequencing. Gait training advanced x40ft with RW and CGA/min A - demos NBOS, decreased step clearance, decreased renetta, and step to pattern initially - improved some with cues and duration. Remained up in chair at end of session - strongly encourage continued OOB to chair and ambulating to/from bathroom with Rw and RN assist. Recommend discharge to SNF once medically cleared.     Progression toward goals:  []       Improving appropriately and progressing toward goals  [x]       Improving slowly and progressing toward goals  []       Not making progress toward goals and plan of care will be adjusted     PLAN:  Patient continues to benefit from skilled intervention to address the above impairments. Continue treatment per established plan of care. Discharge Recommendations:  Skilled Nursing Facility  Further Equipment Recommendations for Discharge:  TBD     SUBJECTIVE:   Patient stated I get so nervous.     OBJECTIVE DATA SUMMARY:   Critical Behavior:  Neurologic State: Alert (off )  Orientation Level: Oriented X4  Cognition: Decreased command following  Safety/Judgement: Awareness of environment, Insight into deficits  Functional Mobility Training:     Transfers:  Sit to Stand: Contact guard assistance  Stand to Sit: Contact guard assistance     Balance:  Sitting: Impaired; Without support  Sitting - Static: Good (unsupported)  Sitting - Dynamic: Good (unsupported)  Standing: Impaired; With support  Standing - Static: Good;Constant support  Standing - Dynamic : Fair  Ambulation/Gait Training:  Distance (ft): 40 Feet (ft)  Assistive Device: Gait belt;Walker, rolling  Ambulation - Level of Assistance: Minimal assistance;Contact guard assistance  Gait Abnormalities: Decreased step clearance;Shuffling gait   Base of Support: Narrowed  Speed/María: Shuffled;Pace decreased (<100 feet/min)  Step Length: Left shortened;Right shortened     Pain:  Pain Scale 1: Numeric (0 - 10)  Pain Intensity 1: 0     Activity Tolerance:   NAD    Please refer to the flowsheet for vital signs taken during this treatment. After treatment:   [x] Patient left in no apparent distress sitting up in chair  [] Patient left in no apparent distress in bed  [x] Call bell left within reach  [x] Nursing notified  [x] family present  [x] chair alarm activated    COMMUNICATION/EDUCATION:   The patients plan of care was discussed with: Registered Nurse. [x]  Fall prevention education was provided and the patient/caregiver indicated understanding.   [x] Patient/family have participated as able in goal setting and plan of care. [x]  Patient/family agree to work toward stated goals and plan of care. []  Patient understands intent and goals of therapy, but is neutral about his/her participation. []  Patient is unable to participate in goal setting and plan of care.     Thank you for this referral.  Miles Baez, PT, DPT   Time Calculation: 21 mins

## 2018-08-09 VITALS
WEIGHT: 109.13 LBS | SYSTOLIC BLOOD PRESSURE: 101 MMHG | RESPIRATION RATE: 16 BRPM | TEMPERATURE: 97.6 F | HEART RATE: 60 BPM | BODY MASS INDEX: 21.42 KG/M2 | DIASTOLIC BLOOD PRESSURE: 50 MMHG | HEIGHT: 60 IN | OXYGEN SATURATION: 98 %

## 2018-08-09 PROBLEM — F41.9 ANXIETY: Status: ACTIVE | Noted: 2018-08-09

## 2018-08-09 LAB
ATRIAL RATE: 27 BPM
CALCULATED R AXIS, ECG10: 19 DEGREES
CALCULATED T AXIS, ECG11: 55 DEGREES
DIAGNOSIS, 93000: NORMAL
Q-T INTERVAL, ECG07: 280 MS
QRS DURATION, ECG06: 76 MS
QTC CALCULATION (BEZET), ECG08: 449 MS
VENTRICULAR RATE, ECG03: 155 BPM

## 2018-08-09 PROCEDURE — 74011250637 HC RX REV CODE- 250/637: Performed by: HOSPITALIST

## 2018-08-09 PROCEDURE — 74011250637 HC RX REV CODE- 250/637: Performed by: PSYCHIATRY & NEUROLOGY

## 2018-08-09 RX ORDER — PAROXETINE 10 MG/5ML
5 SUSPENSION ORAL DAILY
Qty: 75 ML | Refills: 0 | Status: SHIPPED | OUTPATIENT
Start: 2018-08-10 | End: 2019-01-04 | Stop reason: ALTCHOICE

## 2018-08-09 RX ORDER — CLONAZEPAM 0.5 MG/1
0.5 TABLET ORAL
Qty: 30 TAB | Refills: 0 | Status: SHIPPED | OUTPATIENT
Start: 2018-08-09 | End: 2019-01-04 | Stop reason: ALTCHOICE

## 2018-08-09 RX ADMIN — ASPIRIN 81 MG CHEWABLE TABLET 81 MG: 81 TABLET CHEWABLE at 08:33

## 2018-08-09 RX ADMIN — PAROXETINE HYDROCHLORIDE 5 MG: 10 SUSPENSION ORAL at 08:33

## 2018-08-09 RX ADMIN — Medication 10 ML: at 06:09

## 2018-08-09 RX ADMIN — PANTOPRAZOLE SODIUM 40 MG: 40 TABLET, DELAYED RELEASE ORAL at 08:33

## 2018-08-09 RX ADMIN — LACTULOSE 10 G: 20 SOLUTION ORAL at 08:34

## 2018-08-09 RX ADMIN — ATORVASTATIN CALCIUM 80 MG: 40 TABLET, FILM COATED ORAL at 08:33

## 2018-08-09 NOTE — DISCHARGE SUMMARY
DISCHARGE SUMMARY        PATIENT ID: Nia Rivas  MRN: [id number]   YOB: 1940   AGE: 69 y.o. DATE OF ADMISSION: 8/6/2018  5:10 PM    DATE OF DISCHARGE: 8/9/2018  PRIMARY CARE PROVIDER: Sonia Romo MD     Procedure Performed:  none      DISCHARGING PHYSICIAN: Manuel Parker MD    Call hospitalist office [phone]. Discharge Diagnosis:   Patient Active Problem List    Diagnosis Date Noted    Anxiety 08/09/2018    Aphasia 08/06/2018              CONSULTATIONS: IP CONSULT TO PSYCHIATRY  IP CONSULT TO CARDIOLOGY    History of Present Ilness:    HPI: 69 y.o lady with hyperlipidemia who presented after an episode of generalized shaking. She is currently not speaking at all and doesn't provide any history. Her  says that earlier today she had shaking of her arms and legs, she didn't lose consciousness, he is unsure how long it lasted for. He states that after the shaking subsided, she stopped talking. She hasn't had any acute complaints recently. However, she's apparently had a 40 lb weight loss over the past 4 months. Her  also endorses that she has been under a lot of stress due to finances and other personal issues. Hospital Course:     Patient was admitted with Mutism POA. Etiology unknown. Neurology was consulted. CY head 8/6 was negative for acute intracranial abnormality. She also had MRI BRAIN 8/7 . Negative for intracranial mass , hemorrhage or infarct. She had a Psychiatric cosnult and was diagnosed with anxiety and started on Prozac. She also was seen by cardiology for Paroxysmal SVT. Amiodarone dc'd per cardiology. ECHO 8/8 EF 60% with no regional wall motion abnormality. Patient mutism has since resolved. She was also followed by nutrition for PEM. Patient at time of discharge is stable for discharge to SNF.        Physical Exam on Discharge:  Visit Vitals    /50 (BP 1 Location: Right arm, BP Patient Position: At rest)    Pulse 60    Temp 97.6 °F (36.4 °C)    Resp 16    Ht 5' (1.524 m)    Wt 49.5 kg (109 lb 2 oz)    SpO2 98%    BMI 21.31 kg/m2       General:  Alert, cooperative, no distress, appears stated age. Lungs:   Clear to auscultation bilaterally. Chest wall:  No tenderness or deformity. Heart:  Regular rate and rhythm, S1, S2 normal, no murmur, click, rub or gallop. Abdomen:   Soft, non-tender. Bowel sounds normal. No masses,  No organomegaly. Extremities: Extremities normal, atraumatic, no cyanosis or edema. Pulses: 2+ and symmetric all extremities. Skin: Skin color, texture, turgor normal. No rashes or lesions   Neurologic: CNII-XII intact. Pertinent Results:    Recent Labs      08/08/18 0314   BUN  24*   NA  143   CO2  24     Recent Labs      08/08/18 0314   WBC  10.0   RBC  4.83   HCT  45.6   MCV  94.4   MCH  30.0   MCHC  31.8   RDW  12.5       MRI brain 8/7  EXAM:  MRI BRAIN WO CONT  Clinical history: Altered mental status  INDICATION:  Altered mental status, patient presented with generalized tremors,  shaking history, no loss of consciousness.     COMPARISON: CT head 8/7/2018.     CONTRAST:  None.     TECHNIQUE: Sagittal T1, axial FLAIR, T2,T1 and gradient echo images as well as  coronal T2 weighted images and axial diffusion weighted images of the head were  obtained.     FINDINGS: There is no Chiari or syrinx. Pituitary infundibulum unremarkable. Minimal confluent periventricular and scattered foci of increased T2 signal  intensity. There is no evidence of mass, hemorrhage, acute infarct or abnormal  extra-axial fluid collection. Normal appearing flow-voids are present in the  vertebral, basilar and carotid artery systems.  The craniocervical junction is  normal. The structures at the cranial base including paranasal sinuses and  mastoid air cells are unremarkable.     IMPRESSION  IMPRESSION:      Minimal chronic microvascular ischemic change.     No intracranial mass, hemorrhage or evidence of acute infarction    ECHO 8/8      SUMMARY:  Left ventricle: Systolic function was normal. Ejection fraction was  estimated in the range of 55 % to 60 %. There were no regional wall motion  abnormalities. INDICATIONS: Atrial fibrillation. PROCEDURE: This was a routine study. The study included complete 2D  imaging, M-mode, complete spectral Doppler, and color Doppler. Systolic  blood pressure was 99 mmHg, at the start of the study. Diastolic blood  pressure was 69 mmHg, at the start of the study. Image quality was good. LEFT VENTRICLE: Size was normal. Systolic function was normal. Ejection  fraction was estimated in the range of 55 % to 60 %. There were no  regional wall motion abnormalities. Wall thickness was normal.    RIGHT VENTRICLE: The size was normal. Systolic function was normal. Wall  thickness was normal.    LEFT ATRIUM: Size was normal.    RIGHT ATRIUM: Size was normal.    MITRAL VALVE: Normal valve structure. There was normal leaflet separation. DOPPLER: The transmitral velocity was within the normal range. There was  no evidence for stenosis. There was no regurgitation. AORTIC VALVE: The valve was trileaflet. Leaflets exhibited mildly  increased thickness, normal cuspal separation, and sclerosis. DOPPLER:  Transaortic velocity was within the normal range. There was no stenosis. There was no regurgitation. TRICUSPID VALVE: Normal valve structure. There was normal leaflet  separation. DOPPLER: The transtricuspid velocity was within the normal  range. There was no evidence for tricuspid stenosis. There was mild  regurgitation. Pulmonary artery systolic pressure: 30 mmHg. There was mild  pulmonary hypertension. PULMONIC VALVE: Not well visualized, but normal Doppler findings. AORTA: The root exhibited normal size. PERICARDIUM: There was no pericardial effusion.     SYSTEM MEASUREMENT TABLES    2D  Ao Diam: 3 cm  LA Diam: 2.5 cm  LVOT Diam: 1.9 cm  %FS: 30.6 %  EDV(Teich): 50.9 ml  EF(Teich): 59.1 %  ESV(Teich): 20.8 ml  IVSd: 1.2 cm  LVIDd: 3.5 cm  LVIDs: 2.4 cm  LVPWd: 1.1 cm  SV(Teich): 30.1 ml    CW  AV Vmax: 1.4 m/s  AV maxP.7 mmHg  RAP: 3 mmHg  TR Vmax: 2.2 m/s  TR maxP mmHg    PW  SHAWNA Vmax, Pt: 2.2 cm2  AVAI Vmax, Pt: 0 cm2/m2  LVOT Vmax: 1.1 m/s  LVOT maxP.6 mmHg  E' Sept: 0.1 m/s  E/E' Sept: 12.1  MV A Vinayak: 0.7 m/s  MV Dec [place]: 3.9 m/s2  MV DecT: 205.1 ms  MV E Vinayak: 0.8 m/s  MV E/A Ratio: 1.1  MV PHT: 59.5 ms  MVA By PHT: 3.7 cm2  PV Vmax: 0.7 m/s  PV maxP.1 mmHg  RVSP: 23 mmHg    Prepared and E-signed by    Tiff Noguera. Kofi De La Torre M.D. Signed 08-Aug-2018 13:08:31    Ct head   FINDINGS: The ventricles and sulci are appropriate in size and configuration for  age. No loss of gray-white differentiation to suggest late acute or early  subacute infarction. No mass effect or intracranial hemorrhage. Left greater  than right temporomandibular osteoarthritis.     IMPRESSION  IMPRESSION: No acute intracranial abnormality. DISCHARGE MEDICATIONS:   Discharge Medication List as of 2018  9:17 AM      START taking these medications    Details   PARoxetine hcl (PAXIL) 10 mg/5 mL suspension Take 2.5 mL by mouth daily. Indications: ANXIETY WITH DEPRESSION, Print, Disp-75 mL, R-0         CONTINUE these medications which have NOT CHANGED    Details   aspirin 81 mg chewable tablet Take 81 mg by mouth daily. , Historical Med      atorvastatin (LIPITOR) 80 mg tablet Take 80 mg by mouth daily. , Historical Med      clonazePAM (KLONOPIN) 0.5 mg tablet Take 0.5 mg by mouth nightly as needed., Historical Med      pantoprazole (PROTONIX) 40 mg tablet Take 40 mg by mouth two (2) times a day., Historical Med      lactulose (CHRONULAC) 10 gram/15 mL solution Take 10 g by mouth daily (after breakfast). , Historical Med      cyanocobalamin, vitamin B-12, (VITAMIN B12 PO) Take 1 Tab by mouth daily.  Strength unknown, Historical Med      calcium-vitamin D (OYSTER SHELL) 500 mg(1,250mg) -200 unit per tablet Take 1 Tab by mouth two (2) times daily (with meals). , Historical Med         STOP taking these medications       mirtazapine (REMERON) 15 mg tablet Comments:   Reason for Stopping:               Condition at discharge:  Afrebrile  Eating, Drinking, Voiding  Stable    FOLLOW UP APPOINTMENTS:    Follow-up Information     Follow up With Details Comments Contact Info    [address] [place], [place] [address]c[place] [address]  [phone]             Disposition:  SNF       Total discharge preparation time was >30  minutes.

## 2018-08-09 NOTE — PROGRESS NOTES
Stroke Education documented in Patient Education: YES  Core Measures Documented in Connect [facility]:  Risk Factors: YES  Warning signs of stroke: YES  When to Activate 911: YES  Medication Education for Risk Factors: YES  Smoking cessation if applicable: YES  Written Education Given:  YES    Discharge NIH Completed: YES  Score: 0    BRAINS: YES    Follow Up Appointment Made: YES  Date/Time if applicable: with pcp and psych prn    Bedside RN performed patient education and medication education. Discharge concerns initiated and discussed with patient, including clarification on \"who\" assists the patient at their home and instructions for when the home going patient should call their provider after discharge. Opportunity for questions and clarification was provided. Patient receptive to education: YES  Patient stated: Thank you  Barriers to Education: education  Diagnosis Education given:  YES    Length of stay:   Expected Day of Discharge: today  Ask if they have \"Help at Home\" & add to white board? YES    Education Day #: 1    Medication Education Given:  YES  M in the box Medication name: lipitor    Pt aware of HCAHPS survey: YES    I have reviewed discharge instructions with the patient. The patient verbalized understanding. Discharge medications reviewed with patient and appropriate educational materials and side effects teaching were provided. PIV and telemetry removed from patient. Signed copy of discharge instructions placed on chart. 1400- Pt transported via [facility] to [facility]. Report called to [address].

## 2018-08-09 NOTE — PROGRESS NOTES
Spiritual Care Partner Volunteer visited patient in Rm [id number] on 8/9/18. Documented by:   Chaplain Bliss MDiv, MACE  [phone] ([phone])

## 2018-08-09 NOTE — PROGRESS NOTES
Cm received notice from [facility] that they have received auth for this pt to be admitted there. CM informed attending and he has discharged pt today to [facility]. FADY met with pt and  to inform them and they are in agreement with plan. Fady spoke with Amonie in admissions at [facility] and she can accept pt today after 11am. FADY set up ambulance with [facility] for 2pm today. An envelope containing pt's Kardex, UAI and AVS will be sent with this pt to [facility]. Also, pt's nurse will call report.  Puja Miller

## 2018-08-09 NOTE — ROUTINE PROCESS
Bedside shift change report given to Chichi Champagne (oncoming nurse) by Romayne Leep (offgoing nurse). Report included the following information SBAR, Kardex, Intake/Output, MAR, Accordion, Recent Results and Cardiac Rhythm NSR.

## 2018-08-09 NOTE — PROGRESS NOTES
Problem: Falls - Risk of  Goal: *Absence of Falls  Document Derian Fall Risk and appropriate interventions in the flowsheet. Outcome: Progressing Towards Goal  Fall Risk Interventions:  Mobility Interventions: Bed/chair exit alarm, Communicate number of staff needed for ambulation/transfer, Patient to call before getting OOB, PT Consult for mobility concerns, PT Consult for assist device competence    Mentation Interventions: Adequate sleep, hydration, pain control, Bed/chair exit alarm, Door open when patient unattended, More frequent rounding, Increase mobility, Reorient patient, Toileting rounds, Update white board, Room close to nurse's station    Medication Interventions: Bed/chair exit alarm, Patient to call before getting OOB, Teach patient to arise slowly    Elimination Interventions: Bed/chair exit alarm, Call light in reach, Patient to call for help with toileting needs, Toileting schedule/hourly rounds    History of Falls Interventions: Bed/chair exit alarm, Door open when patient unattended        Problem: Pressure Injury - Risk of  Goal: *Prevention of pressure injury  Document James Scale and appropriate interventions in the flowsheet.    Outcome: Progressing Towards Goal  Pressure Injury Interventions:  Sensory Interventions: Assess changes in LOC, Assess need for specialty bed, Avoid rigorous massage over bony prominences, Keep linens dry and wrinkle-free    Moisture Interventions: Absorbent underpads, Assess need for specialty bed, Maintain skin hydration (lotion/cream), Minimize layers    Activity Interventions: Assess need for specialty bed, Increase time out of bed, Pressure redistribution bed/mattress(bed type), PT/OT evaluation    Mobility Interventions: Assess need for specialty bed, HOB 30 degrees or less, PT/OT evaluation    Nutrition Interventions: Document food/fluid/supplement intake    Friction and Shear Interventions: Apply protective barrier, creams and emollients, Lift sheet

## 2018-08-09 NOTE — PROGRESS NOTES
Bedside shift change report given to Lory Martinez (oncoming nurse) by Karina Dickerson (offgoing nurse). Report included the following information SBAR, Kardex, MAR and Recent Results.

## 2018-08-10 NOTE — PROGRESS NOTES
[facility] Medicaid pre-screening was completed yesterday and processed successfully. CM faxed it to [facility] this morning.  Jaime Gordon

## 2018-08-11 ENCOUNTER — HOSPITAL ENCOUNTER (EMERGENCY)
Age: 78
Discharge: STILL ADMITTED OR ADMINISTRATIVE | End: 2018-08-11
Attending: EMERGENCY MEDICINE
Payer: MEDICARE

## 2018-08-11 PROCEDURE — 75810000275 HC EMERGENCY DEPT VISIT NO LEVEL OF CARE

## 2018-08-11 RX ORDER — FENTANYL CITRATE 50 UG/ML
25 INJECTION, SOLUTION INTRAMUSCULAR; INTRAVENOUS
Status: DISCONTINUED | OUTPATIENT
Start: 2018-08-11 | End: 2018-08-11 | Stop reason: HOSPADM

## 2018-12-17 ENCOUNTER — HOSPITAL ENCOUNTER (OUTPATIENT)
Dept: MAMMOGRAPHY | Age: 78
Discharge: HOME | End: 2018-12-17
Payer: MEDICARE

## 2018-12-17 DIAGNOSIS — Z12.39 SCREENING BREAST EXAMINATION: ICD-10-CM

## 2018-12-17 PROCEDURE — 77067 SCR MAMMO BI INCL CAD: CPT

## 2019-01-04 ENCOUNTER — OFFICE VISIT (OUTPATIENT)
Dept: FAMILY MEDICINE CLINIC | Age: 79
End: 2019-01-04

## 2019-01-04 VITALS
WEIGHT: 108.4 LBS | DIASTOLIC BLOOD PRESSURE: 64 MMHG | SYSTOLIC BLOOD PRESSURE: 122 MMHG | RESPIRATION RATE: 16 BRPM | OXYGEN SATURATION: 96 % | TEMPERATURE: 97.4 F | HEIGHT: 60 IN | BODY MASS INDEX: 21.28 KG/M2 | HEART RATE: 63 BPM

## 2019-01-04 DIAGNOSIS — Z76.89 ENCOUNTER TO ESTABLISH CARE: Primary | ICD-10-CM

## 2019-01-04 DIAGNOSIS — K21.9 GASTROESOPHAGEAL REFLUX DISEASE, ESOPHAGITIS PRESENCE NOT SPECIFIED: ICD-10-CM

## 2019-01-04 DIAGNOSIS — E78.00 ELEVATED CHOLESTEROL: ICD-10-CM

## 2019-01-04 DIAGNOSIS — Z00.00 MEDICARE ANNUAL WELLNESS VISIT, SUBSEQUENT: ICD-10-CM

## 2019-01-04 DIAGNOSIS — F41.9 ANXIETY: ICD-10-CM

## 2019-01-04 DIAGNOSIS — K59.00 CONSTIPATION, UNSPECIFIED CONSTIPATION TYPE: ICD-10-CM

## 2019-01-04 DIAGNOSIS — G47.00 INSOMNIA, UNSPECIFIED TYPE: ICD-10-CM

## 2019-01-04 RX ORDER — POLYETHYLENE GLYCOL-3350 AND ELECTROLYTES WITH FLAVOR PACK 240; 5.84; 2.98; 6.72; 22.72 G/278.26G; G/278.26G; G/278.26G; G/278.26G; G/278.26G
POWDER, FOR SOLUTION ORAL
Refills: 0 | COMMUNITY
Start: 2018-12-07 | End: 2019-01-04 | Stop reason: ALTCHOICE

## 2019-01-04 RX ORDER — MIRTAZAPINE 15 MG/1
TABLET, FILM COATED ORAL
Refills: 0 | COMMUNITY
Start: 2018-11-03

## 2019-01-04 RX ORDER — BUSPIRONE HYDROCHLORIDE 7.5 MG/1
TABLET ORAL
Refills: 1 | COMMUNITY
Start: 2018-11-03

## 2019-01-04 RX ORDER — CYCLOSPORINE 0.5 MG/ML
1 EMULSION OPHTHALMIC EVERY 12 HOURS
COMMUNITY

## 2019-01-04 NOTE — PROGRESS NOTES
Chief Complaint Patient presents with [address] [place] [address] 1. Have you been to the ER, urgent care clinic since your last visit? Hospitalized since your last visit? no 
 
2. Have you seen or consulted any other health care providers outside of the [address] [place] [address] since your last visit? Include any pap smears or colon screening.  no

## 2019-01-04 NOTE — PROGRESS NOTES
Glen Roche is a 69 y.o. female who presents for a Medicare Subsequent Annual Wellness Exam and follow up of chronic medical conditions. I have reviewed the patient's medical history in detail and updated the computerized patient record. History Patient Active Problem List  
 Diagnosis  Insomnia  Elevated cholesterol  Constipation  Acid reflux  Anxiety  Aphasia Patient Care Team: 
Twin Lerner NP as PCP - General (Nurse Practitioner) Past Medical History:  
Diagnosis Date  Acid reflux  Arthritis  Constipation  Diarrhea  Elevated cholesterol  Insomnia Past Surgical History:  
Procedure Laterality Date  HX COLONOSCOPY    
 12/27/18  HX HYSTERECTOMY Current Outpatient Medications Medication Sig Dispense Refill  busPIRone (BUSPAR) 7.5 mg tablet TAKE 1 TABLET BY MOUTH EVERY DAY IN THE AM  1  mirtazapine (REMERON) 15 mg tablet TAKE 1 TABLET BY MOUTH EVERY DAY FOR DEPRESSION  0  
 cycloSPORINE (RESTASIS) 0.05 % dpet Administer 1 Drop to both eyes every twelve (12) hours.  aspirin 81 mg chewable tablet Take 81 mg by mouth daily.  atorvastatin (LIPITOR) 80 mg tablet Take 80 mg by mouth daily.  pantoprazole (PROTONIX) 40 mg tablet Take 40 mg by mouth two (2) times a day.  calcium-vitamin D (OYSTER SHELL) 500 mg(1,250mg) -200 unit per tablet Take 1 Tab by mouth two (2) times daily (with meals). Allergies Allergen Reactions  Tylenol [Acetaminophen] Rash and Other (comments) \"it's just too strong for me\" Family History Problem Relation Age of Onset  Stroke Mother  Stroke Father Social History Tobacco Use  Smoking status: Never Smoker  Smokeless tobacco: Never Used Substance Use Topics  Alcohol use: No  
 
Alcohol Risk Factor Screening: You do not drink alcohol or very rarely. Review of Systems: 
 
Functional Ability and Level of Safety:  
Hearing Loss Hearing is good. Activities of Daily Living ADL Assessment 1/4/2019 Feeding yourself No Help Needed Getting from bed to chair No Help Needed Getting dressed No Help Needed Bathing or showering No Help Needed Walk across the room (includes cane/walker) No Help Needed Using the telphone No Help Needed Taking your medications No Help Needed Preparing meals No Help Needed Managing money (expenses/bills) No Help Needed Moderately strenuous housework (laundry) No Help Needed Shopping for personal items (toiletries/medicines) No Help Needed Shopping for groceries No Help Needed Driving No Help Needed Climbing a flight of stairs No Help Needed Getting to places beyond walking distances No Help Needed Fall Risk Fall Risk Assessment, last 12 mths 1/4/2019 Able to walk? Yes Fall in past 12 months? Yes Fall with injury? Yes  
Number of falls in past 12 months 1 Fall Risk Score 2 Abuse Screen Abuse Screening Questionnaire 1/4/2019 Do you ever feel afraid of your partner? Leon García Are you in a relationship with someone who physically or mentally threatens you? Leon García Is it safe for you to go home? Ilene Mortimer Cognitive Screening Evaluation of Cognitive Function: 
Has your family/caregiver stated any concerns about your memory: no 
 
Depression Risk Factor Screening: PHQ over the last two weeks 1/4/2019 Little interest or pleasure in doing things Not at all Feeling down, depressed, irritable, or hopeless Not at all Total Score PHQ 2 0 Physical Exam  
 
Visit Vitals /64 (BP 1 Location: Left arm, BP Patient Position: At rest) Pulse 63 Temp 97.4 °F (36.3 °C) (Oral) Resp 16 Ht 5' (1.524 m) Wt 108 lb 6.4 oz (49.2 kg) SpO2 96% BMI 21.17 kg/m² Assessment/Plan [address] education and counseling provided: 
Age appropriate evidence-based preventive care recommendations based on today's review and evaluation; including relevant cancer screening guidelines, and vaccination recommendations. An After Visit Summary was printed and given to the patient which information about these guidelines, and a personalized schedule for health maintenance items. Whe appropriate and with patient agreement, orders noted below were placed to complete missing health maintenance items. Additional Plan for follow up chronic medical conditions includes: 
Diagnoses and all orders for this visit: 1. Encounter to establish care 2. Elevated cholesterol 3. Insomnia, unspecified type 4. Anxiety 5. Constipation, unspecified constipation type 6. Gastroesophageal reflux disease, esophagitis presence not specified 7. Medicare annual wellness visit, subsequent NEED PREVIOUS RECORDS TO UPDATE HEALTH MAINTENANCE. PATIENT TO DISCUSS SHINGRIX WITH PHARMACY AND OBTAIN FROM THEM Health Maintenance Due Topic Date Due  
 DTaP/Tdap/Td series (1 - Tdap) 11/12/1962  Shingrix Vaccine Age 50> (1 of 2) 11/12/1991  GLAUCOMA SCREENING Q2Y  11/12/2006  Pneumococcal 65+ Low/Medium Risk (1 of 2 - PCV13) 11/12/2006  MEDICARE YEARLY EXAM  03/20/2018 Katya Buchanan NP

## 2019-01-04 NOTE — PROGRESS NOTES
HISTORY OF PRESENT ILLNESS Hailee Cochran is a 69 y.o. female. HPI  Patient comes in today to establish care. Previous PCP - Alice Diggs in [place]. Needs referral to eye doctor. Last visit Nov 2017. Hx of cataracts. Was seeing Dr. Santy Mallory at [facility]. Colonoscopy last week - Dr. Rosalina Valentine at [facility]. States she had 1 polyp removed. Hx hemorrhoids. No problems with blood in stool, rectal bleeding, pain. Mammogram - done at [address] last Nov 2018 DEXA scan in Dec 2017 - showed osteopenia. Taking calcium and Vitamin D Takes cholesterol medication every night. Tolerating without side effects. States she is up to date on pneumonia vaccines States she had lost some weight, weight was down to 90 pounds. Now up to 108 lbs. Memory good. Constipation in past.  States stools are soft now. She does not go every day. Will have BM every 2-3 days. Had labs 1 month ago at [facility]. Allergies Allergen Reactions  Tylenol [Acetaminophen] Rash and Other (comments) \"it's just too strong for me\" Past Medical History:  
Diagnosis Date  Acid reflux  Arthritis  Constipation  Diarrhea  Elevated cholesterol Past Surgical History:  
Procedure Laterality Date  HX COLONOSCOPY    
 12/27/18  HX HYSTERECTOMY Social History Socioeconomic History  Marital status:  Spouse name: Not on file  Number of children: Not on file  Years of education: Not on file  Highest education level: Not on file Social Needs  Financial resource strain: Not on file  Food insecurity - worry: Not on file  Food insecurity - inability: Not on file  Transportation needs - medical: Not on file  Transportation needs - non-medical: Not on file Occupational History  Not on file Tobacco Use  Smoking status: Never Smoker  Smokeless tobacco: Never Used Substance and Sexual Activity  Alcohol use: No  
 Drug use:  No  
  Sexual activity: Yes  
  Partners: Male Birth control/protection: None, Surgical  
  Comment:  Other Topics Concern  Not on file Social History Narrative  Not on file Family History Problem Relation Age of Onset  Stroke Mother  Stroke Father Current Outpatient Medications Medication Sig  
 busPIRone (BUSPAR) 7.5 mg tablet TAKE 1 TABLET BY MOUTH EVERY DAY IN THE AM  
 mirtazapine (REMERON) 15 mg tablet TAKE 1 TABLET BY MOUTH EVERY DAY FOR DEPRESSION  cycloSPORINE (RESTASIS) 0.05 % dpet Administer 1 Drop to both eyes every twelve (12) hours.  aspirin 81 mg chewable tablet Take 81 mg by mouth daily.  atorvastatin (LIPITOR) 80 mg tablet Take 80 mg by mouth daily.  pantoprazole (PROTONIX) 40 mg tablet Take 40 mg by mouth two (2) times a day.  calcium-vitamin D (OYSTER SHELL) 500 mg(1,250mg) -200 unit per tablet Take 1 Tab by mouth two (2) times daily (with meals). No current facility-administered medications for this visit. Review of Systems Constitutional: Negative for chills, diaphoresis, fever, malaise/fatigue and weight loss. HENT: Negative for congestion, ear pain, sore throat and tinnitus. Eyes: Negative for blurred vision and double vision. Respiratory: Negative for cough, sputum production, shortness of breath and wheezing. Cardiovascular: Negative for chest pain, palpitations and leg swelling. Gastrointestinal: Positive for constipation. Negative for abdominal pain, blood in stool, diarrhea, nausea and vomiting. Genitourinary: Negative for dysuria, flank pain, frequency, hematuria and urgency. Musculoskeletal: Negative for back pain, joint pain and myalgias. Skin: Negative. Neurological: Negative for dizziness, tingling, sensory change, speech change, focal weakness and headaches. Psychiatric/Behavioral: Negative for depression.  The patient is nervous/anxious (stable on Buspar) and has insomnia (stable with remeron). Vitals:  
 01/04/19 8594 BP: 122/64 Pulse: 63 Resp: 16 Temp: 97.4 °F (36.3 °C) TempSrc: Oral  
SpO2: 96% Weight: 108 lb 6.4 oz (49.2 kg) Height: 5' (1.524 m) Physical Exam  
Constitutional: She is oriented to person, place, and time. Vital signs are normal. She appears well-developed. She appears cachectic. She is cooperative. HENT:  
Right Ear: Hearing, tympanic membrane, external ear and ear canal normal.  
Left Ear: Hearing, tympanic membrane, external ear and ear canal normal.  
Nose: Nose normal. Right sinus exhibits no maxillary sinus tenderness and no frontal sinus tenderness. Left sinus exhibits no maxillary sinus tenderness and no frontal sinus tenderness. Mouth/Throat: Uvula is midline, oropharynx is clear and moist and mucous membranes are normal. Mucous membranes are not pale and not dry. No oropharyngeal exudate, posterior oropharyngeal edema or posterior oropharyngeal erythema. Neck: No thyroid mass and no thyromegaly present. Cardiovascular: Normal rate, regular rhythm, S1 normal, S2 normal and normal heart sounds. No murmur heard. Pulses: 
     Radial pulses are 2+ on the right side, and 2+ on the left side. Dorsalis pedis pulses are 2+ on the right side, and 2+ on the left side. Posterior tibial pulses are 2+ on the right side, and 2+ on the left side. Pulmonary/Chest: Effort normal and breath sounds normal. She has no decreased breath sounds. She has no wheezes. She has no rhonchi. She has no rales. Abdominal: Soft. Normal appearance and bowel sounds are normal. There is no hepatosplenomegaly. There is no tenderness. There is no CVA tenderness. Lymphadenopathy:  
     Head (right side): No submental, no submandibular, no tonsillar, no preauricular and no posterior auricular adenopathy present.   
     Head (left side): No submental, no submandibular, no tonsillar, no preauricular and no posterior auricular adenopathy present. She has no cervical adenopathy. Right: No supraclavicular adenopathy present. Left: No supraclavicular adenopathy present. Neurological: She is alert and oriented to person, place, and time. Skin: Skin is warm, dry and intact. Psychiatric: She has a normal mood and affect. Her speech is normal and behavior is normal. Thought content normal.  
Vitals reviewed. ASSESSMENT and PLAN 
  ICD-10-CM ICD-9-CM 1. Encounter to establish care Z76.89 V65.8 2. Elevated cholesterol E78.00 272.0   
3. Insomnia, unspecified type G47.00 780.52   
4. Anxiety F41.9 300.00   
5. Constipation, unspecified constipation type K59.00 564.00   
6. Gastroesophageal reflux disease, esophagitis presence not specified K21.9 530.81   
7. Medicare annual wellness visit, subsequent Z00.00 V70.0 Encounter Diagnoses Name Primary?  Encounter to establish care Yes  Elevated cholesterol  Insomnia, unspecified type  Anxiety  Constipation, unspecified constipation type  Gastroesophageal reflux disease, esophagitis presence not specified  Medicare annual wellness visit, subsequent Orders Placed This Encounter  busPIRone (BUSPAR) 7.5 mg tablet  mirtazapine (REMERON) 15 mg tablet  DISCONTD: GAVILYTE-C [id number] -5.84 gram solution  cycloSPORINE (RESTASIS) 0.05 % dpet Diagnoses and all orders for this visit: 1. Encounter to establish care - need to obtain records from [facility] and Dr. Zenovia Phoenix (GI) 2. Elevated cholesterol 3. Insomnia, unspecified type 4. Anxiety 5. Constipation, unspecified constipation type 6. Gastroesophageal reflux disease, esophagitis presence not specified 7. Medicare annual wellness visit, subsequent Follow-up Disposition: 
Return in about 3 months (around 4/4/2019). I have reviewed the patient's allergies and made any necessary changes. Medical, procedural, social and family histories have been reviewed and updated as medically indicated. I have reconciled and/or revised patient medications in the EMR. I have discussed each diagnosis listed in this note with Laura Nava and/or their family. I have discussed treatment options and the risk/benefit analysis of those options, including safe use of medications and possible medication side effects. Through the use of shared decision making we have agreed to the above plan. The patient has received an after-visit summary and questions were answered concerning future plans. Judi Pride, JAQUANP-C This note will not be viewable in [address]

## 2019-01-09 ENCOUNTER — TELEPHONE (OUTPATIENT)
Dept: FAMILY MEDICINE CLINIC | Age: 79
End: 2019-01-09

## 2019-01-09 DIAGNOSIS — Z01.00 ROUTINE EYE EXAM: Primary | ICD-10-CM

## 2019-01-09 NOTE — TELEPHONE ENCOUNTER
----- Message from Joann Nguyen sent at 1/9/2019 11:38 AM EST -----  Regarding: BALDO Pride/ Telephone  Pt is needing a referral to see Dr. Radu Levine (eye doctor).  Fax number: [phone] Telephone: [phone] She has an appointment on Jan 16 at 11:00am. Phone: [phone]

## 2019-01-17 RX ORDER — PANTOPRAZOLE SODIUM 40 MG/1
40 TABLET, DELAYED RELEASE ORAL 2 TIMES DAILY
Qty: 180 TAB | Refills: 2 | Status: SHIPPED | OUTPATIENT
Start: 2019-01-17

## 2019-01-17 NOTE — TELEPHONE ENCOUNTER
Last Visit: 1/4/19  Next Appt: 4/4/19  Previous Refill Encounter: Diff. Provider    Requested Prescriptions     Pending Prescriptions Disp Refills    pantoprazole (PROTONIX) 40 mg tablet 180 Tab 0     Sig: Take 1 Tab by mouth two (2) times a day.

## 2019-02-01 NOTE — TELEPHONE ENCOUNTER
Last Visit: 1/4  Next Appt: 4/4  Previous Refill Encounter: Diff. Provider    Requested Prescriptions     Pending Prescriptions Disp Refills    alendronate (FOSAMAX) 70 mg tablet 12 Tab 0     Sig: Take 1 Tab by mouth every seven (7) days.

## 2019-02-04 ENCOUNTER — TELEPHONE (OUTPATIENT)
Dept: FAMILY MEDICINE CLINIC | Age: 79
End: 2019-02-04

## 2019-02-04 DIAGNOSIS — M81.0 OSTEOPOROSIS, UNSPECIFIED OSTEOPOROSIS TYPE, UNSPECIFIED PATHOLOGICAL FRACTURE PRESENCE: Primary | ICD-10-CM

## 2019-02-04 RX ORDER — ALENDRONATE SODIUM 70 MG/1
70 TABLET ORAL
Qty: 12 TAB | Refills: 3 | Status: SHIPPED | OUTPATIENT
Start: 2019-02-04

## 2019-02-04 RX ORDER — ALENDRONATE SODIUM 70 MG/1
70 TABLET ORAL
Qty: 12 TAB | Refills: 0 | OUTPATIENT
Start: 2019-02-04

## 2019-02-04 NOTE — TELEPHONE ENCOUNTER
Patient came in stating that she needs a prescription refilled. She is not due to come back until April 4th. She will need:    Alendronate Sodium 70 mg    She will need a paper prescription written so that she can fill it at [facility] pharmacy. She is out of this prescription. Patient can be reached at [phone].

## 2019-12-19 ENCOUNTER — HOSPITAL ENCOUNTER (OUTPATIENT)
Dept: BONE DENSITY | Age: 79
Discharge: HOME | End: 2019-12-19
Attending: INTERNAL MEDICINE
Payer: MEDICARE

## 2019-12-19 ENCOUNTER — HOSPITAL ENCOUNTER (OUTPATIENT)
Dept: MAMMOGRAPHY | Age: 79
Discharge: HOME | End: 2019-12-19
Attending: INTERNAL MEDICINE
Payer: MEDICARE

## 2019-12-19 DIAGNOSIS — Z78.0 MENOPAUSE: ICD-10-CM

## 2019-12-19 DIAGNOSIS — Z12.31 VISIT FOR SCREENING MAMMOGRAM: ICD-10-CM

## 2019-12-19 PROCEDURE — 77067 SCR MAMMO BI INCL CAD: CPT

## 2019-12-20 ENCOUNTER — HOSPITAL ENCOUNTER (OUTPATIENT)
Dept: BONE DENSITY | Age: 79
Discharge: HOME | End: 2019-12-20
Attending: INTERNAL MEDICINE
Payer: MEDICARE

## 2019-12-20 PROCEDURE — 77080 DXA BONE DENSITY AXIAL: CPT

## 2020-11-13 ENCOUNTER — TRANSCRIBE ORDER (OUTPATIENT)
Dept: SCHEDULING | Age: 80
End: 2020-11-13

## 2020-11-13 DIAGNOSIS — Z78.0 MENOPAUSE: ICD-10-CM

## 2020-11-13 DIAGNOSIS — Z12.31 VISIT FOR SCREENING MAMMOGRAM: Primary | ICD-10-CM

## 2021-01-12 ENCOUNTER — HOSPITAL ENCOUNTER (OUTPATIENT)
Dept: MAMMOGRAPHY | Age: 81
Discharge: HOME | End: 2021-01-12
Attending: INTERNAL MEDICINE
Payer: MEDICARE

## 2021-01-12 DIAGNOSIS — Z12.31 VISIT FOR SCREENING MAMMOGRAM: ICD-10-CM

## 2021-01-12 PROCEDURE — 77067 SCR MAMMO BI INCL CAD: CPT

## 2021-12-20 ENCOUNTER — TRANSCRIBE ORDER (OUTPATIENT)
Dept: SCHEDULING | Age: 81
End: 2021-12-20

## 2021-12-20 DIAGNOSIS — M81.0 SENILE OSTEOPOROSIS: ICD-10-CM

## 2021-12-20 DIAGNOSIS — Z12.31 VISIT FOR SCREENING MAMMOGRAM: Primary | ICD-10-CM

## 2022-01-20 ENCOUNTER — HOSPITAL ENCOUNTER (OUTPATIENT)
Dept: MAMMOGRAPHY | Age: 82
Discharge: HOME | End: 2022-01-20
Attending: INTERNAL MEDICINE
Payer: MEDICARE

## 2022-01-20 DIAGNOSIS — Z12.31 VISIT FOR SCREENING MAMMOGRAM: ICD-10-CM

## 2022-01-20 PROCEDURE — 77067 SCR MAMMO BI INCL CAD: CPT

## 2022-01-27 ENCOUNTER — HOSPITAL ENCOUNTER (OUTPATIENT)
Dept: BONE DENSITY | Age: 82
Discharge: HOME | End: 2022-01-20
Attending: INTERNAL MEDICINE
Payer: MEDICARE

## 2022-01-27 DIAGNOSIS — M81.0 SENILE OSTEOPOROSIS: ICD-10-CM

## 2022-02-17 ENCOUNTER — HOSPITAL ENCOUNTER (OUTPATIENT)
Dept: BONE DENSITY | Age: 82
Discharge: HOME | End: 2022-02-17
Attending: INTERNAL MEDICINE
Payer: MEDICARE

## 2022-02-17 PROCEDURE — 77080 DXA BONE DENSITY AXIAL: CPT

## 2022-03-19 PROBLEM — R47.01 APHASIA: Status: ACTIVE | Noted: 2018-08-06

## 2022-03-20 PROBLEM — F41.9 ANXIETY: Status: ACTIVE | Noted: 2018-08-09

## 2023-02-07 ENCOUNTER — TRANSCRIBE ORDER (OUTPATIENT)
Dept: MAMMOGRAPHY | Age: 83
End: 2023-02-07

## 2023-02-07 DIAGNOSIS — Z12.31 VISIT FOR SCREENING MAMMOGRAM: Primary | ICD-10-CM

## 2023-03-23 ENCOUNTER — HOSPITAL ENCOUNTER (OUTPATIENT)
Dept: MAMMOGRAPHY | Age: 83
Discharge: HOME | End: 2023-03-23
Payer: MEDICARE

## 2023-03-23 DIAGNOSIS — Z12.31 VISIT FOR SCREENING MAMMOGRAM: ICD-10-CM

## 2023-03-23 PROCEDURE — 77063 BREAST TOMOSYNTHESIS BI: CPT

## 2023-05-23 RX ORDER — ATORVASTATIN CALCIUM 80 MG/1
80 TABLET, FILM COATED ORAL DAILY
COMMUNITY

## 2023-05-23 RX ORDER — B-COMPLEX WITH VITAMIN C
1 TABLET ORAL 2 TIMES DAILY WITH MEALS
COMMUNITY

## 2023-05-23 RX ORDER — CYCLOSPORINE 0.5 MG/ML
1 EMULSION OPHTHALMIC EVERY 12 HOURS
COMMUNITY

## 2023-05-23 RX ORDER — PANTOPRAZOLE SODIUM 40 MG/1
40 TABLET, DELAYED RELEASE ORAL 2 TIMES DAILY
COMMUNITY
Start: 2019-01-17

## 2023-05-23 RX ORDER — BUSPIRONE HYDROCHLORIDE 7.5 MG/1
TABLET ORAL
COMMUNITY
Start: 2018-11-03

## 2023-05-23 RX ORDER — ALENDRONATE SODIUM 70 MG/1
70 TABLET ORAL
COMMUNITY
Start: 2019-02-04

## 2023-05-23 RX ORDER — MIRTAZAPINE 15 MG/1
TABLET, FILM COATED ORAL
COMMUNITY
Start: 2018-11-03

## 2023-05-23 RX ORDER — ASPIRIN 81 MG/1
81 TABLET, CHEWABLE ORAL DAILY
COMMUNITY

## 2024-04-04 ENCOUNTER — HOSPITAL ENCOUNTER (OUTPATIENT)
Facility: HOSPITAL | Age: 84
Discharge: HOME | End: 2024-04-04
Payer: MEDICARE

## 2024-04-04 DIAGNOSIS — M81.0 OSTEOPOROSIS, UNSPECIFIED OSTEOPOROSIS TYPE, UNSPECIFIED PATHOLOGICAL FRACTURE PRESENCE: ICD-10-CM

## 2024-04-04 PROCEDURE — 77080 DXA BONE DENSITY AXIAL: CPT

## 2024-05-01 ENCOUNTER — HOSPITAL ENCOUNTER (OUTPATIENT)
Facility: HOSPITAL | Age: 84
Discharge: HOME | End: 2024-05-04
Payer: MEDICARE

## 2024-05-01 VITALS — HEIGHT: 60 IN | WEIGHT: 141 LBS | BODY MASS INDEX: 27.68 KG/M2

## 2024-05-01 DIAGNOSIS — Z12.31 VISIT FOR SCREENING MAMMOGRAM: ICD-10-CM

## 2024-05-01 PROCEDURE — 77063 BREAST TOMOSYNTHESIS BI: CPT

## 2025-04-25 ENCOUNTER — TRANSCRIBE ORDERS (OUTPATIENT)
Facility: HOSPITAL | Age: 85
End: 2025-04-25

## 2025-04-25 DIAGNOSIS — Z12.31 VISIT FOR SCREENING MAMMOGRAM: Primary | ICD-10-CM

## 2025-06-02 ENCOUNTER — HOSPITAL ENCOUNTER (OUTPATIENT)
Facility: HOSPITAL | Age: 85
Discharge: HOME | End: 2025-06-05
Payer: MEDICARE

## 2025-06-02 DIAGNOSIS — Z12.31 VISIT FOR SCREENING MAMMOGRAM: ICD-10-CM

## 2025-06-02 PROCEDURE — 77067 SCR MAMMO BI INCL CAD: CPT
